# Patient Record
Sex: FEMALE | Race: ASIAN | NOT HISPANIC OR LATINO
[De-identification: names, ages, dates, MRNs, and addresses within clinical notes are randomized per-mention and may not be internally consistent; named-entity substitution may affect disease eponyms.]

---

## 2019-08-13 PROBLEM — Z00.00 ENCOUNTER FOR PREVENTIVE HEALTH EXAMINATION: Status: ACTIVE | Noted: 2019-08-13

## 2019-09-05 ENCOUNTER — APPOINTMENT (OUTPATIENT)
Dept: ORTHOPEDIC SURGERY | Facility: CLINIC | Age: 59
End: 2019-09-05
Payer: SELF-PAY

## 2019-09-05 VITALS
HEART RATE: 75 BPM | DIASTOLIC BLOOD PRESSURE: 75 MMHG | SYSTOLIC BLOOD PRESSURE: 169 MMHG | HEIGHT: 59 IN | BODY MASS INDEX: 35.48 KG/M2 | WEIGHT: 176 LBS

## 2019-09-05 PROCEDURE — 99204 OFFICE O/P NEW MOD 45 MIN: CPT

## 2019-09-05 PROCEDURE — 72170 X-RAY EXAM OF PELVIS: CPT

## 2019-09-05 PROCEDURE — 73562 X-RAY EXAM OF KNEE 3: CPT | Mod: 50

## 2019-09-17 NOTE — DISCUSSION/SUMMARY
[de-identified] : Bilateral Knee advanced degenerative joint disease, left worse than right \par \par The natural history and treatment of degenerative arthritis was discussed with the patient at length today. The spectrum of treatment including nonoperative modalities to surgical intervention was elucidated. Noninvasive and nonoperative treatment modalities include weight reduction, activity modification with low impact exercise,  as needed use of acetaminophen or anti-inflammatory medications if tolerated, glucosamine/chondroitin supplements, and physical therapy. Further treatments can include corticosteroid injection and the use of viscosupplementation with hyaluronic acid injections. Definitive surgical treatment can certainly include total joint arthroplasty also. The risks and benefits of each treatment options was discussed and all questions were answered.\par \par In view of lack of adequate pain relief with conservative (non-surgical) management protocol including physical therapy, home exercises, weight loss, activity modification, NSAIDS; the patient is recommended to consider a Left Total Knee Replacement. \par \par The risks, benefits, alternatives, implications, complications including but not limited to pain, stiffness, bleeding, limp, wound breakdown, infection, bone fracture, nerve and vascular compromise, implant wear, fixation options depending on bone quality, instability, and durability issues and rehabilitation were discussed and relevant questions were addressed. The possibility of recurrent pain, no improvement in pain and actual worsening of the pain were also mentioned in conversation with the patient. Medical complications related to the patient's general medical health including deep vein thrombosis, pulmonary embolus, heart attack, stroke, death and other complications from anesthesia were discussed as well.  Anticoagulation prophylaxis medication options to address risks of deep vein thrombosis and pulmonary embolism were discussed and weighed against the risks of bleeding and wound healing complications. The patient elected Ecotrin/Xarelto prophylaxis with mechanical modalities.\par \par I have reviewed the plan of care as well as a model of a total knee replacement implant equivalent to the one that will be used for their total knee replacement.  The patient agrees with the plan of care, as well as the use of implants for their total knee replacement.  The patient wishes to proceed and will undergo preoperative medical evaluation and clearance, attend the preoperative educational class and will schedule surgery appropriately.\par \par Patient currently has no health insurance, she will discuss with her PCP and find an insurance plan then book surgery.

## 2019-09-17 NOTE — PHYSICAL EXAM
[Antalgic] : antalgic [LE] : Sensory: Intact in bilateral lower extremities [Knee] : patellar 2+ and symmetric bilaterally [Ankle] : ankle 2+ and symmetric bilaterally [DP] : dorsalis pedis 2+ and symmetric bilaterally [PT] : posterior tibial 2+ and symmetric bilaterally [de-identified] : The following radiographs were ordered and read by me during this patients visit. I reviewed each radiograph in detail with the patient and discussed the findings as highlighted below. \par AP, lateral and skyline views of the bilateral knees confirm advanced degenerative joint disease with medial joint space narrowing and osteophyte formation, varus deformity bilaterally. \par AP view of the pelvis are within normal limits  [de-identified] : On general examination the patient is adequately groomed and nourished. The vital parameters are as recorded. \par There is no lymphedema or diffuse swelling, no varicose veins, no skin warmth/erythema/scars/swelling, no ulcers and no palpable lymph nodes or masses in both lower extremities. Bilateral pedal pulses are well palpable.\par Upper Extremity:\par Both right and left upper extremities are unremarkable in terms of skin rash, lesions, pigmentation, redness, tenderness, swelling, joint instability, abnormal deformity or crepitus. The overall range of motion, sensation, motor tone and strength testing are normal.\par \par Bilateral Knee Exam: \par The gait is bilateral stiff knee antalgic.\par Knee alignment:            Right 5 degrees varus with mild flexion deformity. \par Left 5 degrees varus with mild flexion deformity.\par Both knees demonstrate no scars and the skin has no warmth, erythema, swelling or tenderness. \par Both knees have a range of motion of\par Extension:                    Right 2 degrees     Left 2 degrees\par Flexion:                                   Right 100 degrees      Left 90 degrees\par There is bilateral knee medial joint line tenderness. There is bilateral knee mild effusion. \par Quang's test is positive. Lissa test is positive.\par Lachman's test, Anterior/Posterior Drawer test and Pivot Shift Tests are negative. \par There is bilateral knee grade 1 MCL mediolateral laxity and no anteroposterior instability. \par Patella compression test is positive and patellofemoral tracking is normal with no lateral subluxation, apprehension or instability. \par Right knee quadriceps and hamstrings power is 4+.\par Left knee quadriceps and hamstrings power is 4+.\par \par Hip Exam:\par The gait and station is normal\par The patient has equal leg lengths and no pelvic tilt. Alejo/Marisela test is 7 inches on the right and 7 inches on the left. Active SLR is 60 degrees on the right and 60 degrees on the left. Both hips demonstrate no scars and the skin has no signs of inflammation or tenderness. \par Both Hips have a normal range of motion of flexion to 100 degrees, abduction 40 degrees, adduction 20 degrees, external rotation 40 degrees, internal rotation 20 degrees with symmetrical motion in flexion and extension. There is no flexion contracture, deformity or instability. Labral impingement tests are negative.\par Both hips flexor, abductor and extensor power is normal.

## 2019-09-17 NOTE — HISTORY OF PRESENT ILLNESS
[7] : an average pain level of 7/10 [de-identified] : Ms. MARY KATE KLEIN is a 58 year old female presenting with bilateral knee pain, left worse than right, now worsening. She localizes the pain to the medial and anterior aspect of the bilateral knee. The patient describes the pain as dull and achy, and states it is intermittent, based on activity. She states the pain is exacerbated by walking long distance, climbing/descending stairs, and rising from a seated position. Patient admits to swelling of the bilateral knees. She has been taking NSAIDs for pain with only mild temporary relief. She admits to prior conservative management inclusive of physical therapy with only mild improvement in condition. Had a steroid injection 2-3 years ago with only temporary relief. She denies hip or lower back pain. The patient admits to limitations in their quality of life, and is present to discuss options for treatment. JTM \par Patient denies any other pertinent past medical, surgical, familial, of social history.

## 2020-10-02 ENCOUNTER — APPOINTMENT (OUTPATIENT)
Dept: ORTHOPEDIC SURGERY | Facility: CLINIC | Age: 60
End: 2020-10-02
Payer: COMMERCIAL

## 2020-10-02 VITALS
DIASTOLIC BLOOD PRESSURE: 69 MMHG | BODY MASS INDEX: 36.08 KG/M2 | HEART RATE: 80 BPM | SYSTOLIC BLOOD PRESSURE: 156 MMHG | WEIGHT: 179 LBS | HEIGHT: 59 IN

## 2020-10-02 DIAGNOSIS — Z78.9 OTHER SPECIFIED HEALTH STATUS: ICD-10-CM

## 2020-10-02 DIAGNOSIS — M19.90 UNSPECIFIED OSTEOARTHRITIS, UNSPECIFIED SITE: ICD-10-CM

## 2020-10-02 DIAGNOSIS — I83.899 VARICOSE VEINS OF UNSPECIFIED LOWER EXTREMITY WITH OTHER COMPLICATIONS: ICD-10-CM

## 2020-10-02 PROCEDURE — 73562 X-RAY EXAM OF KNEE 3: CPT | Mod: 50

## 2020-10-02 PROCEDURE — 99214 OFFICE O/P EST MOD 30 MIN: CPT

## 2020-10-02 NOTE — PHYSICAL EXAM
[de-identified] : On general examination the patient is adequately groomed and nourished. The vital parameters are as recorded. \par There is no lymphedema, there is diffuse swelling with varicose veins, no skin warmth/erythema/scars/swelling, no ulcers and no palpable lymph nodes or masses in both lower extremities. Bilateral pedal pulses are well palpable.\par Upper Extremity:\par Both right and left upper extremities are unremarkable in terms of skin rash, lesions, pigmentation, redness, tenderness, swelling, joint instability, abnormal deformity or crepitus. The overall range of motion, sensation, motor tone and strength testing are normal.\par \par Bilateral Knee Exam: \par The gait is bilateral stiff knee antalgic.\par Knee alignment:            Right 12 degrees varus with no flexion deformity. \par Left 15 degrees varus with no flexion deformity.\par Both knees demonstrate no scars and the skin has no warmth, erythema, swelling or tenderness. \par Both knees have a range of motion of\par Extension:                    Right -10 degrees     Left -10 degrees\par Flexion:                                   Right 100 degrees      Left 90 degrees\par There is bilateral knee medial joint line tenderness. There is bilateral knee mild effusion. \par Quang's test is positive. Lissa test is positive.\par Lachman's test, Anterior/Posterior Drawer test and Pivot Shift Tests are negative. \par There is bilateral knee grade 1 MCL mediolateral laxity and no anteroposterior instability. \par Patella compression test is positive and patellofemoral tracking is normal with no lateral subluxation, apprehension or instability. \par Right knee quadriceps and hamstrings power is 4+.\par Left knee quadriceps and hamstrings power is 4+.\par \par Hip Exam:\par The gait and station is normal\par The patient has equal leg lengths and no pelvic tilt. Alejo/Marisela test is 7 inches on the right and 7 inches on the left. Active SLR is 60 degrees on the right and 60 degrees on the left. Both hips demonstrate no scars and the skin has no signs of inflammation or tenderness. \par Both Hips have a normal range of motion of flexion to 100 degrees, abduction 40 degrees, adduction 20 degrees, external rotation 40 degrees, internal rotation 20 degrees with symmetrical motion in flexion and extension. There is no flexion contracture, deformity or instability. Labral impingement tests are negative.\par Both hips flexor, abductor and extensor power is normal.\par  [de-identified] : The following radiographs were ordered and read by me during this patients visit. I reviewed each radiograph in detail with the patient and discussed the findings as highlighted below. \par AP, lateral and skyline views of the bilateral knees confirm advanced degenerative joint disease with medial joint space bone on bone articulation, varus malalignment, and osteophyte formation\par AP view of the pelvis are within normal limits\par

## 2020-10-02 NOTE — DISCUSSION/SUMMARY
[de-identified] : Bilateral knee advanced degenerative joint disease, left more than right, with varicose veins and swelling BL LE. \par The natural history and treatment of degenerative arthritis was discussed with the patient at length today. The spectrum of treatment including nonoperative modalities to surgical intervention was elucidated. Noninvasive and nonoperative treatment modalities include weight reduction, activity modification with low impact exercise,  as needed use of acetaminophen or anti-inflammatory medications if tolerated, glucosamine/chondroitin supplements, and physical therapy. Further treatments can include corticosteroid injection and the use of viscosupplementation with hyaluronic acid injections. Definitive surgical treatment can certainly include total joint arthroplasty also. The risks and benefits of each treatment options was discussed and all questions were answered.\par In view of lack of adequate pain relief with conservative (non-surgical) management protocol including physical therapy, home exercises, weight loss, activity modification, NSAIDS; the patient is recommended to consider a staged bilateral Total Knee Replacement, left knee first. \par \par The risks, benefits, alternatives, implications, complications including but not limited to pain, stiffness, bleeding, limp, wound breakdown, infection, bone fracture, nerve and vascular compromise, implant wear, fixation options depending on bone quality, instability, and durability issues and rehabilitation were discussed and relevant questions were addressed. The possibility of recurrent pain, no improvement in pain and actual worsening of the pain were also mentioned in conversation with the patient. Medical complications related to the patient's general medical health including deep vein thrombosis, pulmonary embolus, heart attack, stroke, death and other complications from anesthesia were discussed as well.  Anticoagulation prophylaxis medication options to address risks of deep vein thrombosis and pulmonary embolism were discussed and weighed against the risks of bleeding and wound healing complications. The patient elected Ecotrin/Xarelto prophylaxis with mechanical modalities.\par \par  I have reviewed the plan of care as well as a model of a total knee replacement implant equivalent to the one that will be used for their total knee replacement.  The patient agrees with the plan of care, as well as the use of implants for their total knee replacement.  The patient wishes to proceed and will undergo preoperative medical evaluation and clearance, attend the preoperative educational class and will schedule surgery appropriately.\par \par Patient has been indicated for a CHAZ Robotic Assisted total knee replacement. A prescription for a CT scan with CHAZ Protocol was provided for the left knee.\par \par I have provided the patient a referral to Dr. Marcano for evaluation of varicose veins and swelling. Concerning the swelling, compression has been recommended, as well as elevation of the lower extremities. She  has been recommended for exercise, including foot pumps and walking. She has been provided with a prescription for compression stockings. She will need a preop skin check appointment one week pre operative.

## 2020-11-09 ENCOUNTER — APPOINTMENT (OUTPATIENT)
Dept: VASCULAR SURGERY | Facility: CLINIC | Age: 60
End: 2020-11-09
Payer: COMMERCIAL

## 2020-11-09 VITALS
HEART RATE: 81 BPM | SYSTOLIC BLOOD PRESSURE: 172 MMHG | TEMPERATURE: 97.6 F | DIASTOLIC BLOOD PRESSURE: 85 MMHG | HEIGHT: 59 IN | BODY MASS INDEX: 36.29 KG/M2 | WEIGHT: 180 LBS

## 2020-11-09 DIAGNOSIS — I83.893 VARICOSE VEINS OF BILATERAL LOWER EXTREMITIES WITH OTHER COMPLICATIONS: ICD-10-CM

## 2020-11-09 DIAGNOSIS — I65.23 OCCLUSION AND STENOSIS OF BILATERAL CAROTID ARTERIES: ICD-10-CM

## 2020-11-09 DIAGNOSIS — I87.2 VENOUS INSUFFICIENCY (CHRONIC) (PERIPHERAL): ICD-10-CM

## 2020-11-09 PROCEDURE — 99204 OFFICE O/P NEW MOD 45 MIN: CPT

## 2020-11-09 PROCEDURE — 93880 EXTRACRANIAL BILAT STUDY: CPT | Mod: 26

## 2020-11-09 PROCEDURE — 93970 EXTREMITY STUDY: CPT

## 2020-11-09 PROCEDURE — 93880 EXTRACRANIAL BILAT STUDY: CPT

## 2020-11-09 RX ORDER — LISINOPRIL 5 MG/1
5 TABLET ORAL
Qty: 90 | Refills: 0 | Status: ACTIVE | COMMUNITY
Start: 2020-11-06

## 2020-11-09 RX ORDER — LEVOTHYROXINE SODIUM 0.03 MG/1
25 TABLET ORAL
Qty: 90 | Refills: 0 | Status: ACTIVE | COMMUNITY
Start: 2020-11-06

## 2020-11-09 NOTE — ASSESSMENT
[FreeTextEntry1] : Impression ssymptomatic venous insuff not yet sono evident, \par \par \par Plan Med Conservative management leg elevation, knee high compression stockings  20-30mm Hg (rx given), wt loss, diet control, exercise program, protective measures\par ov w carotid duplex s/o stenosis  4 years  nov 2024\par pt is cleared from vasc surg standpoint for TKR \par f/u w Dr ERASMO Watson MD \par telehealth visit in 2 mo to re eval \par \par \par Varicose veins are enlarged, twisted veins. Varicose veins are caused by increased blood pressure in the veins.  The blood moves towards the heart by 1-way valves in the veins. When the valves become weakened or damaged, blood can collect in the veins and pool in your lower legs (ankles). This causes the veins to become enlarged and incompetent with reflux. Sitting or standing for long periods can cause blood to pool in the leg veins, increasing the pressure within the veins. \par Risk factors for varicose veins or venous disease may include:  obesity, older age, standing or sitting for prolonged periods of time for several years, being female, pregnancy, taking oral contraceptive pills or hormone replacement, being inactive, and/or smoking. \par The most common symptoms of varicose veins are sensations in the legs, such as a heavy feeling, burning, and/or aching. However, each individual may experience symptoms differently.  Other symptoms may include:  color changes in the skin, sores on the legs, or rash.  Severe varicose veins or venous disease may eventually produce long-term mild swelling that can result in more serious skin and tissue problems, such as ulcers and non-healing sores.\par Varicose veins and venous disease are diagnosed by a complete medical history, physical examination, and diagnostic studies for varicose veins including duplex ultrasound and color-flow imaging.  \par Medical treatment for varicose veins and venous disease include:  compression stockings, sclerotherapy, endovenous ablation and/or surgical treatment with microphlebectomy.  \par \par  [Arterial/Venous Disease] : arterial/venous disease [Other: _____] : [unfilled]

## 2020-11-09 NOTE — DATA REVIEWED
[FreeTextEntry1] : 11/9/2020 Venous Doppler Chris LE  no acute dvt svt \par                            RLE no sono evidence of reflux\par                            LLE  no sono evidence of reflux \par                                     \par \par 11/9/2020 Carotid Duplex  Rt ICA  less 50% stenosis  by velocity criteria\par                          Lt  ICA  less 50% stenosis  by velocity criteria\par                           Chris Ant Vertebral Arterial Flow \par \par

## 2020-11-09 NOTE — HISTORY OF PRESENT ILLNESS
[FreeTextEntry1] : Pt c/o bilateral leg pain swelling heaviness and discomfort worse w activity and by the end of the day\par Onset 19 years ago\par Intensity moderate \par Pt denies recent injury, travel or thrombophilic event\par \par Pt denies art insuff sx \par Pt denies any recent  cardiac c/o , SOB, Chest Pain or recent heart attacks \par Pt  is mauricio for Lt TKR w Dr ERASMO Watson \par \par \par

## 2021-08-10 ENCOUNTER — APPOINTMENT (OUTPATIENT)
Dept: ORTHOPEDIC SURGERY | Facility: CLINIC | Age: 61
End: 2021-08-10
Payer: COMMERCIAL

## 2021-08-10 DIAGNOSIS — M17.12 UNILATERAL PRIMARY OSTEOARTHRITIS, LEFT KNEE: ICD-10-CM

## 2021-08-10 PROCEDURE — 73562 X-RAY EXAM OF KNEE 3: CPT | Mod: LT

## 2021-08-10 PROCEDURE — 99215 OFFICE O/P EST HI 40 MIN: CPT

## 2021-08-16 PROBLEM — M17.12 PRIMARY LOCALIZED OSTEOARTHRITIS OF LEFT KNEE: Status: ACTIVE | Noted: 2020-10-02

## 2021-09-08 ENCOUNTER — APPOINTMENT (OUTPATIENT)
Dept: ORTHOPEDIC SURGERY | Facility: CLINIC | Age: 61
End: 2021-09-08

## 2021-10-05 ENCOUNTER — OUTPATIENT (OUTPATIENT)
Dept: OUTPATIENT SERVICES | Facility: HOSPITAL | Age: 61
LOS: 1 days | End: 2021-10-05
Payer: COMMERCIAL

## 2021-10-05 VITALS
DIASTOLIC BLOOD PRESSURE: 77 MMHG | HEIGHT: 58.25 IN | OXYGEN SATURATION: 100 % | RESPIRATION RATE: 18 BRPM | SYSTOLIC BLOOD PRESSURE: 152 MMHG | HEART RATE: 69 BPM | WEIGHT: 179.9 LBS | TEMPERATURE: 97 F

## 2021-10-05 DIAGNOSIS — M17.12 UNILATERAL PRIMARY OSTEOARTHRITIS, LEFT KNEE: ICD-10-CM

## 2021-10-05 DIAGNOSIS — Z29.9 ENCOUNTER FOR PROPHYLACTIC MEASURES, UNSPECIFIED: ICD-10-CM

## 2021-10-05 DIAGNOSIS — Z98.890 OTHER SPECIFIED POSTPROCEDURAL STATES: Chronic | ICD-10-CM

## 2021-10-05 DIAGNOSIS — G47.33 OBSTRUCTIVE SLEEP APNEA (ADULT) (PEDIATRIC): ICD-10-CM

## 2021-10-05 DIAGNOSIS — Z01.818 ENCOUNTER FOR OTHER PREPROCEDURAL EXAMINATION: ICD-10-CM

## 2021-10-05 LAB
ANION GAP SERPL CALC-SCNC: 12 MMOL/L — SIGNIFICANT CHANGE UP (ref 5–17)
BLD GP AB SCN SERPL QL: NEGATIVE — SIGNIFICANT CHANGE UP
BUN SERPL-MCNC: 11 MG/DL — SIGNIFICANT CHANGE UP (ref 7–23)
CALCIUM SERPL-MCNC: 10.3 MG/DL — SIGNIFICANT CHANGE UP (ref 8.4–10.5)
CHLORIDE SERPL-SCNC: 100 MMOL/L — SIGNIFICANT CHANGE UP (ref 96–108)
CO2 SERPL-SCNC: 26 MMOL/L — SIGNIFICANT CHANGE UP (ref 22–31)
CREAT SERPL-MCNC: 0.61 MG/DL — SIGNIFICANT CHANGE UP (ref 0.5–1.3)
GLUCOSE SERPL-MCNC: 83 MG/DL — SIGNIFICANT CHANGE UP (ref 70–99)
HCT VFR BLD CALC: 44.5 % — SIGNIFICANT CHANGE UP (ref 34.5–45)
HGB BLD-MCNC: 13.7 G/DL — SIGNIFICANT CHANGE UP (ref 11.5–15.5)
MCHC RBC-ENTMCNC: 27.8 PG — SIGNIFICANT CHANGE UP (ref 27–34)
MCHC RBC-ENTMCNC: 30.8 GM/DL — LOW (ref 32–36)
MCV RBC AUTO: 90.4 FL — SIGNIFICANT CHANGE UP (ref 80–100)
NRBC # BLD: 0 /100 WBCS — SIGNIFICANT CHANGE UP (ref 0–0)
PLATELET # BLD AUTO: 359 K/UL — SIGNIFICANT CHANGE UP (ref 150–400)
POTASSIUM SERPL-MCNC: 4.2 MMOL/L — SIGNIFICANT CHANGE UP (ref 3.5–5.3)
POTASSIUM SERPL-SCNC: 4.2 MMOL/L — SIGNIFICANT CHANGE UP (ref 3.5–5.3)
RBC # BLD: 4.92 M/UL — SIGNIFICANT CHANGE UP (ref 3.8–5.2)
RBC # FLD: 12.4 % — SIGNIFICANT CHANGE UP (ref 10.3–14.5)
RH IG SCN BLD-IMP: POSITIVE — SIGNIFICANT CHANGE UP
SODIUM SERPL-SCNC: 138 MMOL/L — SIGNIFICANT CHANGE UP (ref 135–145)
WBC # BLD: 9.48 K/UL — SIGNIFICANT CHANGE UP (ref 3.8–10.5)
WBC # FLD AUTO: 9.48 K/UL — SIGNIFICANT CHANGE UP (ref 3.8–10.5)

## 2021-10-05 PROCEDURE — G0463: CPT

## 2021-10-05 PROCEDURE — 83036 HEMOGLOBIN GLYCOSYLATED A1C: CPT

## 2021-10-05 PROCEDURE — 86901 BLOOD TYPING SEROLOGIC RH(D): CPT

## 2021-10-05 PROCEDURE — 80048 BASIC METABOLIC PNL TOTAL CA: CPT

## 2021-10-05 PROCEDURE — 87640 STAPH A DNA AMP PROBE: CPT

## 2021-10-05 PROCEDURE — 87641 MR-STAPH DNA AMP PROBE: CPT

## 2021-10-05 PROCEDURE — 86850 RBC ANTIBODY SCREEN: CPT

## 2021-10-05 PROCEDURE — 85027 COMPLETE CBC AUTOMATED: CPT

## 2021-10-05 PROCEDURE — 86900 BLOOD TYPING SEROLOGIC ABO: CPT

## 2021-10-05 RX ORDER — CEFAZOLIN SODIUM 1 G
2000 VIAL (EA) INJECTION ONCE
Refills: 0 | Status: DISCONTINUED | OUTPATIENT
Start: 2021-10-19 | End: 2021-10-22

## 2021-10-05 NOTE — H&P PST ADULT - ASSESSMENT
CAPRINI SCORE [CLOT]    AGE RELATED RISK FACTORS                                                       MOBILITY RELATED FACTORS  [ ] Age 41-60 years                                            (1 Point)                  [ ] Bed rest                                                        (1 Point)  [x ] Age: 61-74 years                                           (2 Points)                 [ ] Plaster cast                                                   (2 Points)  [ ] Age= 75 years                                              (3 Points)                 [ ] Bed bound for more than 72 hours                 (2 Points)    DISEASE RELATED RISK FACTORS                                               GENDER SPECIFIC FACTORS  [ x] Edema in the lower extremities                       (1 Point)                  [ ] Pregnancy                                                     (1 Point)  [ ] Varicose veins                                               (1 Point)                  [ ] Post-partum < 6 weeks                                   (1 Point)             [ x] BMI > 25 Kg/m2                                            (1 Point)                  [ ] Hormonal therapy  or oral contraception          (1 Point)                 [ ] Sepsis (in the previous month)                        (1 Point)                  [ ] History of pregnancy complications                 (1 point)  [ ] Pneumonia or serious lung disease                                               [ ] Unexplained or recurrent                     (1 Point)           (in the previous month)                               (1 Point)  [ ] Abnormal pulmonary function test                     (1 Point)                 SURGERY RELATED RISK FACTORS  [ ] Acute myocardial infarction                              (1 Point)                 [ ]  Section                                             (1 Point)  [ ] Congestive heart failure (in the previous month)  (1 Point)               [ ] Minor surgery                                                  (1 Point)   [ ] Inflammatory bowel disease                             (1 Point)                 [ ] Arthroscopic surgery                                        (2 Points)  [ ] Central venous access                                      (2 Points)                [x ] General surgery lasting more than 45 minutes   (2 Points)       [ ] Stroke (in the previous month)                          (5 Points)               [ ] Elective arthroplasty                                         (5 Points)                                                                                                                                               HEMATOLOGY RELATED FACTORS                                                 TRAUMA RELATED RISK FACTORS  [ ] Prior episodes of VTE                                     (3 Points)                [ ] Fracture of the hip, pelvis, or leg                       (5 Points)  [ ] Positive family history for VTE                         (3 Points)                 [ ] Acute spinal cord injury (in the previous month)  (5 Points)  [ ] Prothrombin 63719 A                                     (3 Points)                 [ ] Paralysis  (less than 1 month)                             (5 Points)  [ ] Factor V Leiden                                             (3 Points)                  [ ] Multiple Trauma within 1 month                        (5 Points)  [ ] Lupus anticoagulants                                     (3 Points)                                                           [ ] Anticardiolipin antibodies                               (3 Points)                                                       [ ] High homocysteine in the blood                      (3 Points)                                             [ ] Other congenital or acquired thrombophilia      (3 Points)                                                [ ] Heparin induced thrombocytopenia                  (3 Points)                                          Total Score [     6    ]    Caprini Score 0 - 2:  Low Risk, No VTE Prophylaxis required for most patients, encourage ambulation  Caprini Score 3 - 6:  At Risk, pharmacologic VTE prophylaxis is indicated for most patients (in the absence of a contraindication)  Caprini Score Greater than or = 7:  High Risk, pharmacologic VTE prophylaxis is indicated for most patients (in the absence of a contraindication)

## 2021-10-05 NOTE — H&P PST ADULT - PROBLEM SELECTOR PLAN 1
Left total knee replacement with CHAZ robot on 10/19/21 with Dr. Rishabh Carballo.  Pre-op instructions given. Questions answered.  COVID19 PCR at [facility] on 10/16/21.

## 2021-10-05 NOTE — H&P PST ADULT - NSICDXPASTMEDICALHX_GEN_ALL_CORE_FT
PAST MEDICAL HISTORY:  Arthritis     High cholesterol     HTN (hypertension)     Hypothyroidism      PAST MEDICAL HISTORY:  Arthritis     High cholesterol     HTN (hypertension)     Hypothyroidism     Obesity     PVD (peripheral vascular disease)

## 2021-10-05 NOTE — H&P PST ADULT - HISTORY OF PRESENT ILLNESS
Very pleasant 59 yo female in NAD presents to PST prior to scheduled LEFT knee replacement with CHAZ protocol on 10/19/21 with Dr. Rishabh Carballo. She is accompanied by her .  Pmhx includes obesity (BMI 37.3), HTN, HLD, hypothyroidism, arthritis, anxiety. Patient reports intermittent sharp pain in the left knee, accompanied with swelling. Pain is exacerbated with movement, standing for long periods of time and bending. She has tried conservative measures such as NSAIDS, gel injections and rest w/ little pain relief. Reports recent imaging showed "bone on bone arthritis". Shew as recently evaluated by Dr. Carballo and the above surgery was recommended.    Patient denies previous Covid19 infection/exposure,recent travel,fevers,chills,cough,SOB,loss of sense of smell/taste.  COVID19 vaccination series completed.  COVID19 PCR at [facility] on 10/16/21

## 2021-10-05 NOTE — H&P PST ADULT - NSICDXFAMILYHX_GEN_ALL_CORE_FT
FAMILY HISTORY:  Sibling  Still living? Unknown  FH: type 2 diabetes, Age at diagnosis: Age Unknown

## 2021-10-06 LAB
A1C WITH ESTIMATED AVERAGE GLUCOSE RESULT: 6.4 % — HIGH (ref 4–5.6)
ESTIMATED AVERAGE GLUCOSE: 137 MG/DL — HIGH (ref 68–114)
MRSA PCR RESULT.: SIGNIFICANT CHANGE UP
S AUREUS DNA NOSE QL NAA+PROBE: SIGNIFICANT CHANGE UP

## 2021-10-12 PROBLEM — E78.00 PURE HYPERCHOLESTEROLEMIA, UNSPECIFIED: Chronic | Status: ACTIVE | Noted: 2021-10-05

## 2021-10-12 PROBLEM — I10 ESSENTIAL (PRIMARY) HYPERTENSION: Chronic | Status: ACTIVE | Noted: 2021-10-05

## 2021-10-12 PROBLEM — E03.9 HYPOTHYROIDISM, UNSPECIFIED: Chronic | Status: ACTIVE | Noted: 2021-10-05

## 2021-10-12 PROBLEM — M19.90 UNSPECIFIED OSTEOARTHRITIS, UNSPECIFIED SITE: Chronic | Status: ACTIVE | Noted: 2021-10-05

## 2021-10-12 PROBLEM — E66.9 OBESITY, UNSPECIFIED: Chronic | Status: ACTIVE | Noted: 2021-10-05

## 2021-10-12 PROBLEM — I73.9 PERIPHERAL VASCULAR DISEASE, UNSPECIFIED: Chronic | Status: ACTIVE | Noted: 2021-10-05

## 2021-10-14 ENCOUNTER — APPOINTMENT (OUTPATIENT)
Dept: CT IMAGING | Facility: CLINIC | Age: 61
End: 2021-10-14
Payer: COMMERCIAL

## 2021-10-14 ENCOUNTER — OUTPATIENT (OUTPATIENT)
Dept: OUTPATIENT SERVICES | Facility: HOSPITAL | Age: 61
LOS: 1 days | End: 2021-10-14
Payer: COMMERCIAL

## 2021-10-14 DIAGNOSIS — Z98.890 OTHER SPECIFIED POSTPROCEDURAL STATES: Chronic | ICD-10-CM

## 2021-10-14 DIAGNOSIS — M17.12 UNILATERAL PRIMARY OSTEOARTHRITIS, LEFT KNEE: ICD-10-CM

## 2021-10-14 PROCEDURE — 73700 CT LOWER EXTREMITY W/O DYE: CPT | Mod: 26,LT

## 2021-10-14 PROCEDURE — 73700 CT LOWER EXTREMITY W/O DYE: CPT

## 2021-10-16 ENCOUNTER — OUTPATIENT (OUTPATIENT)
Dept: OUTPATIENT SERVICES | Facility: HOSPITAL | Age: 61
LOS: 1 days | End: 2021-10-16
Payer: COMMERCIAL

## 2021-10-16 DIAGNOSIS — Z98.890 OTHER SPECIFIED POSTPROCEDURAL STATES: Chronic | ICD-10-CM

## 2021-10-16 DIAGNOSIS — Z11.52 ENCOUNTER FOR SCREENING FOR COVID-19: ICD-10-CM

## 2021-10-16 LAB — SARS-COV-2 RNA SPEC QL NAA+PROBE: SIGNIFICANT CHANGE UP

## 2021-10-16 PROCEDURE — U0005: CPT

## 2021-10-16 PROCEDURE — U0003: CPT

## 2021-10-16 PROCEDURE — C9803: CPT

## 2021-10-18 ENCOUNTER — TRANSCRIPTION ENCOUNTER (OUTPATIENT)
Age: 61
End: 2021-10-18

## 2021-10-19 ENCOUNTER — APPOINTMENT (OUTPATIENT)
Dept: ORTHOPEDIC SURGERY | Facility: HOSPITAL | Age: 61
End: 2021-10-19

## 2021-10-19 ENCOUNTER — INPATIENT (INPATIENT)
Facility: HOSPITAL | Age: 61
LOS: 2 days | Discharge: NURSING, REHAB OR LONG-TERM CARE | DRG: 470 | End: 2021-10-22
Attending: ORTHOPAEDIC SURGERY | Admitting: ORTHOPAEDIC SURGERY
Payer: COMMERCIAL

## 2021-10-19 VITALS
WEIGHT: 179.9 LBS | HEART RATE: 84 BPM | DIASTOLIC BLOOD PRESSURE: 83 MMHG | OXYGEN SATURATION: 99 % | TEMPERATURE: 98 F | RESPIRATION RATE: 18 BRPM | SYSTOLIC BLOOD PRESSURE: 180 MMHG | HEIGHT: 58 IN

## 2021-10-19 DIAGNOSIS — M17.12 UNILATERAL PRIMARY OSTEOARTHRITIS, LEFT KNEE: ICD-10-CM

## 2021-10-19 DIAGNOSIS — Z98.890 OTHER SPECIFIED POSTPROCEDURAL STATES: Chronic | ICD-10-CM

## 2021-10-19 LAB
GLUCOSE BLDC GLUCOMTR-MCNC: 122 MG/DL — HIGH (ref 70–99)
GLUCOSE BLDC GLUCOMTR-MCNC: 133 MG/DL — HIGH (ref 70–99)
RH IG SCN BLD-IMP: POSITIVE — SIGNIFICANT CHANGE UP

## 2021-10-19 PROCEDURE — S2900 ROBOTIC SURGICAL SYSTEM: CPT | Mod: NC

## 2021-10-19 PROCEDURE — 27447 TOTAL KNEE ARTHROPLASTY: CPT | Mod: LT

## 2021-10-19 PROCEDURE — 0055T BONE SRGRY CMPTR CT/MRI IMAG: CPT

## 2021-10-19 PROCEDURE — 20985 CPTR-ASST DIR MS PX: CPT

## 2021-10-19 PROCEDURE — 73560 X-RAY EXAM OF KNEE 1 OR 2: CPT | Mod: 26,LT

## 2021-10-19 RX ORDER — ATORVASTATIN CALCIUM 80 MG/1
10 TABLET, FILM COATED ORAL AT BEDTIME
Refills: 0 | Status: DISCONTINUED | OUTPATIENT
Start: 2021-10-19 | End: 2021-10-22

## 2021-10-19 RX ORDER — CHLORHEXIDINE GLUCONATE 213 G/1000ML
1 SOLUTION TOPICAL ONCE
Refills: 0 | Status: DISCONTINUED | OUTPATIENT
Start: 2021-10-19 | End: 2021-10-19

## 2021-10-19 RX ORDER — KETOROLAC TROMETHAMINE 30 MG/ML
15 SYRINGE (ML) INJECTION EVERY 6 HOURS
Refills: 0 | Status: DISCONTINUED | OUTPATIENT
Start: 2021-10-19 | End: 2021-10-20

## 2021-10-19 RX ORDER — SODIUM CHLORIDE 9 MG/ML
500 INJECTION, SOLUTION INTRAVENOUS ONCE
Refills: 0 | Status: COMPLETED | OUTPATIENT
Start: 2021-10-19 | End: 2021-10-19

## 2021-10-19 RX ORDER — HYDROMORPHONE HYDROCHLORIDE 2 MG/ML
0.5 INJECTION INTRAMUSCULAR; INTRAVENOUS; SUBCUTANEOUS
Refills: 0 | Status: DISCONTINUED | OUTPATIENT
Start: 2021-10-19 | End: 2021-10-19

## 2021-10-19 RX ORDER — INFLUENZA VIRUS VACCINE 15; 15; 15; 15 UG/.5ML; UG/.5ML; UG/.5ML; UG/.5ML
0.5 SUSPENSION INTRAMUSCULAR ONCE
Refills: 0 | Status: DISCONTINUED | OUTPATIENT
Start: 2021-10-19 | End: 2021-10-22

## 2021-10-19 RX ORDER — LANOLIN ALCOHOL/MO/W.PET/CERES
3 CREAM (GRAM) TOPICAL AT BEDTIME
Refills: 0 | Status: DISCONTINUED | OUTPATIENT
Start: 2021-10-19 | End: 2021-10-22

## 2021-10-19 RX ORDER — LISINOPRIL 2.5 MG/1
5 TABLET ORAL ONCE
Refills: 0 | Status: COMPLETED | OUTPATIENT
Start: 2021-10-19 | End: 2021-10-19

## 2021-10-19 RX ORDER — DEXAMETHASONE 0.5 MG/5ML
8 ELIXIR ORAL ONCE
Refills: 0 | Status: COMPLETED | OUTPATIENT
Start: 2021-10-20 | End: 2021-10-20

## 2021-10-19 RX ORDER — CEFAZOLIN SODIUM 1 G
2000 VIAL (EA) INJECTION EVERY 8 HOURS
Refills: 0 | Status: COMPLETED | OUTPATIENT
Start: 2021-10-19 | End: 2021-10-19

## 2021-10-19 RX ORDER — ASPIRIN/CALCIUM CARB/MAGNESIUM 324 MG
325 TABLET ORAL
Refills: 0 | Status: DISCONTINUED | OUTPATIENT
Start: 2021-10-19 | End: 2021-10-22

## 2021-10-19 RX ORDER — TRAMADOL HYDROCHLORIDE 50 MG/1
50 TABLET ORAL ONCE
Refills: 0 | Status: DISCONTINUED | OUTPATIENT
Start: 2021-10-19 | End: 2021-10-19

## 2021-10-19 RX ORDER — CELECOXIB 200 MG/1
200 CAPSULE ORAL EVERY 12 HOURS
Refills: 0 | Status: DISCONTINUED | OUTPATIENT
Start: 2021-10-20 | End: 2021-10-22

## 2021-10-19 RX ORDER — OXYCODONE HYDROCHLORIDE 5 MG/1
5 TABLET ORAL
Refills: 0 | Status: DISCONTINUED | OUTPATIENT
Start: 2021-10-19 | End: 2021-10-22

## 2021-10-19 RX ORDER — SENNA PLUS 8.6 MG/1
2 TABLET ORAL AT BEDTIME
Refills: 0 | Status: DISCONTINUED | OUTPATIENT
Start: 2021-10-19 | End: 2021-10-22

## 2021-10-19 RX ORDER — LISINOPRIL 2.5 MG/1
5 TABLET ORAL DAILY
Refills: 0 | Status: DISCONTINUED | OUTPATIENT
Start: 2021-10-19 | End: 2021-10-22

## 2021-10-19 RX ORDER — ONDANSETRON 8 MG/1
4 TABLET, FILM COATED ORAL EVERY 6 HOURS
Refills: 0 | Status: DISCONTINUED | OUTPATIENT
Start: 2021-10-19 | End: 2021-10-22

## 2021-10-19 RX ORDER — ACETAMINOPHEN 500 MG
975 TABLET ORAL EVERY 8 HOURS
Refills: 0 | Status: DISCONTINUED | OUTPATIENT
Start: 2021-10-19 | End: 2021-10-22

## 2021-10-19 RX ORDER — TRAMADOL HYDROCHLORIDE 50 MG/1
50 TABLET ORAL EVERY 6 HOURS
Refills: 0 | Status: DISCONTINUED | OUTPATIENT
Start: 2021-10-19 | End: 2021-10-22

## 2021-10-19 RX ORDER — SODIUM CHLORIDE 9 MG/ML
3 INJECTION INTRAMUSCULAR; INTRAVENOUS; SUBCUTANEOUS EVERY 8 HOURS
Refills: 0 | Status: DISCONTINUED | OUTPATIENT
Start: 2021-10-19 | End: 2021-10-19

## 2021-10-19 RX ORDER — LEVOTHYROXINE SODIUM 125 MCG
25 TABLET ORAL DAILY
Refills: 0 | Status: DISCONTINUED | OUTPATIENT
Start: 2021-10-19 | End: 2021-10-22

## 2021-10-19 RX ORDER — SODIUM CHLORIDE 9 MG/ML
500 INJECTION, SOLUTION INTRAVENOUS ONCE
Refills: 0 | Status: COMPLETED | OUTPATIENT
Start: 2021-10-19 | End: 2021-10-20

## 2021-10-19 RX ORDER — PANTOPRAZOLE SODIUM 20 MG/1
40 TABLET, DELAYED RELEASE ORAL ONCE
Refills: 0 | Status: COMPLETED | OUTPATIENT
Start: 2021-10-19 | End: 2021-10-19

## 2021-10-19 RX ORDER — SODIUM CHLORIDE 9 MG/ML
1000 INJECTION INTRAMUSCULAR; INTRAVENOUS; SUBCUTANEOUS
Refills: 0 | Status: DISCONTINUED | OUTPATIENT
Start: 2021-10-20 | End: 2021-10-22

## 2021-10-19 RX ORDER — PANTOPRAZOLE SODIUM 20 MG/1
40 TABLET, DELAYED RELEASE ORAL
Refills: 0 | Status: DISCONTINUED | OUTPATIENT
Start: 2021-10-19 | End: 2021-10-22

## 2021-10-19 RX ORDER — LIDOCAINE HCL 20 MG/ML
0.2 VIAL (ML) INJECTION ONCE
Refills: 0 | Status: DISCONTINUED | OUTPATIENT
Start: 2021-10-19 | End: 2021-10-19

## 2021-10-19 RX ORDER — MAGNESIUM HYDROXIDE 400 MG/1
30 TABLET, CHEWABLE ORAL DAILY
Refills: 0 | Status: DISCONTINUED | OUTPATIENT
Start: 2021-10-19 | End: 2021-10-22

## 2021-10-19 RX ORDER — ACETAMINOPHEN 500 MG
1000 TABLET ORAL ONCE
Refills: 0 | Status: DISCONTINUED | OUTPATIENT
Start: 2021-10-19 | End: 2021-10-22

## 2021-10-19 RX ORDER — OXYCODONE HYDROCHLORIDE 5 MG/1
10 TABLET ORAL
Refills: 0 | Status: DISCONTINUED | OUTPATIENT
Start: 2021-10-19 | End: 2021-10-22

## 2021-10-19 RX ORDER — BENZOCAINE AND MENTHOL 5; 1 G/100ML; G/100ML
1 LIQUID ORAL AT BEDTIME
Refills: 0 | Status: DISCONTINUED | OUTPATIENT
Start: 2021-10-19 | End: 2021-10-22

## 2021-10-19 RX ORDER — POLYETHYLENE GLYCOL 3350 17 G/17G
17 POWDER, FOR SOLUTION ORAL AT BEDTIME
Refills: 0 | Status: DISCONTINUED | OUTPATIENT
Start: 2021-10-19 | End: 2021-10-22

## 2021-10-19 RX ADMIN — BENZOCAINE AND MENTHOL 1 LOZENGE: 5; 1 LIQUID ORAL at 23:01

## 2021-10-19 RX ADMIN — Medication 15 MILLIGRAM(S): at 16:51

## 2021-10-19 RX ADMIN — Medication 975 MILLIGRAM(S): at 23:32

## 2021-10-19 RX ADMIN — Medication 15 MILLIGRAM(S): at 23:32

## 2021-10-19 RX ADMIN — Medication 975 MILLIGRAM(S): at 16:50

## 2021-10-19 RX ADMIN — Medication 25 MICROGRAM(S): at 11:48

## 2021-10-19 RX ADMIN — TRAMADOL HYDROCHLORIDE 50 MILLIGRAM(S): 50 TABLET ORAL at 08:11

## 2021-10-19 RX ADMIN — Medication 325 MILLIGRAM(S): at 17:47

## 2021-10-19 RX ADMIN — POLYETHYLENE GLYCOL 3350 17 GRAM(S): 17 POWDER, FOR SOLUTION ORAL at 23:02

## 2021-10-19 RX ADMIN — Medication 15 MILLIGRAM(S): at 17:20

## 2021-10-19 RX ADMIN — Medication 975 MILLIGRAM(S): at 17:20

## 2021-10-19 RX ADMIN — ATORVASTATIN CALCIUM 10 MILLIGRAM(S): 80 TABLET, FILM COATED ORAL at 23:01

## 2021-10-19 RX ADMIN — TRAMADOL HYDROCHLORIDE 50 MILLIGRAM(S): 50 TABLET ORAL at 18:15

## 2021-10-19 RX ADMIN — SENNA PLUS 2 TABLET(S): 8.6 TABLET ORAL at 23:01

## 2021-10-19 RX ADMIN — Medication 3 MILLIGRAM(S): at 23:01

## 2021-10-19 RX ADMIN — Medication 100 MILLIGRAM(S): at 16:50

## 2021-10-19 RX ADMIN — PANTOPRAZOLE SODIUM 40 MILLIGRAM(S): 20 TABLET, DELAYED RELEASE ORAL at 08:12

## 2021-10-19 RX ADMIN — Medication 150 MILLIGRAM(S): at 08:11

## 2021-10-19 RX ADMIN — Medication 100 MILLIGRAM(S): at 23:01

## 2021-10-19 RX ADMIN — LISINOPRIL 5 MILLIGRAM(S): 2.5 TABLET ORAL at 13:15

## 2021-10-19 RX ADMIN — LISINOPRIL 5 MILLIGRAM(S): 2.5 TABLET ORAL at 11:45

## 2021-10-19 RX ADMIN — Medication 15 MILLIGRAM(S): at 23:02

## 2021-10-19 RX ADMIN — TRAMADOL HYDROCHLORIDE 50 MILLIGRAM(S): 50 TABLET ORAL at 17:47

## 2021-10-19 RX ADMIN — Medication 975 MILLIGRAM(S): at 23:01

## 2021-10-19 NOTE — PHYSICAL THERAPY INITIAL EVALUATION ADULT - PERTINENT HX OF CURRENT PROBLEM, REHAB EVAL
Pt is a 59y/o F with PMH of obesity, HTN, HLD, hypothyroidism, arthritis and anxiety. Patient c/o intermittent sharp pain in the L knee which was exacerbated with movement, standing for long periods of time and bending. Pt s/p L TKA with CHAZ protocol 10/19.

## 2021-10-19 NOTE — PHYSICAL THERAPY INITIAL EVALUATION ADULT - GAIT DEVIATIONS NOTED, PT EVAL
decreased ramya/increased time in double stance/decreased velocity of limb motion/decreased step length/decreased stride length/decreased weight-shifting ability

## 2021-10-19 NOTE — OCCUPATIONAL THERAPY INITIAL EVALUATION ADULT - ANTICIPATED DISCHARGE DISPOSITION, OT EVAL
TBD Subacute rehab. If home, assist from family as needed & home OT to address ADL/IADL, AE/DME, and fall prevention

## 2021-10-19 NOTE — PHYSICAL THERAPY INITIAL EVALUATION ADULT - ACTIVE RANGE OF MOTION EXAMINATION, REHAB EVAL
LLE ROM limited 2/2 TKA/bilateral upper extremity Active ROM was WFL (within functional limits)/Right LE Active ROM was WFL (within functional limits)

## 2021-10-19 NOTE — CHART NOTE - NSCHARTNOTEFT_GEN_A_CORE
Patient seen on 7 [place] with  at bedside. Resting without complaints.  No Chest Pain, SOB, N/V.    T(C): 36.7 (10-19-21 @ 14:50), Max: 36.9 (10-19-21 @ 07:41)  HR: 80 (10-19-21 @ 14:50) (60 - 84)  BP: 166/76 (10-19-21 @ 14:50) (140/67 - 180/83)  RR: 18 (10-19-21 @ 14:50) (14 - 18)  SpO2: 97% (10-19-21 @ 14:50) (97% - 100%)  Wt(kg): --    Exam:  Alert and [place], No Acute Distress  Card: +S1/S2, RRR  Pulm: CTAB  Laterality: Left knee aquacel c/d/i  Calves soft, non-tender bilaterally  +PF/DF/EHL/FHL  SILT  + DP pulse    Xray: Post-op xray in chart             A/P: Patient is a 60y Female S/p Left TKA. VSS. NAD  -PT/OT- WBAT to LLE  -IS encouraged  -DVT PPx - Aspirin 325 mg BID  -Pain Control PRN  -OOB to chair  -FU AM Labs  -Continue Current Tx  -Dispo planning    Monika Barry PA-C  Team Pager #[phone]

## 2021-10-19 NOTE — PHYSICAL THERAPY INITIAL EVALUATION ADULT - ADDITIONAL COMMENTS
Pt lives in a private home with her  and daughter. There a 4 steps to enter and 1 flight of stairs inside with ~10 steps. Pt was (I) with all ADLs and functional mobility, pt used a cane when amb.

## 2021-10-19 NOTE — PHYSICAL THERAPY INITIAL EVALUATION ADULT - TRANSFER SAFETY CONCERNS NOTED: SIT/STAND, REHAB EVAL
decreased balance during turns/losing balance/stand pivot/decreased weight-shifting ability decreased balance during turns/losing balance/decreased weight-shifting ability

## 2021-10-19 NOTE — OCCUPATIONAL THERAPY INITIAL EVALUATION ADULT - LEVEL OF INDEPENDENCE: BED TO CHAIR, REHAB EVAL
Stand Pivot/minimum assist (75% patients effort) Pt took 2 steps/minimum assist (75% patients effort) contact guard

## 2021-10-19 NOTE — PRE-OP CHECKLIST - AS BP NONINV METHOD
Visit Information Date & Time Provider Department Dept. Phone Encounter #  
 2/15/2017  9:20 AM Deshawn Logan MD [facility] [phone] Follow-up Instructions Return in about 3 months (around 5/15/2017). Upcoming Health Maintenance Date Due DTaP/Tdap/Td series (1 - Tdap) 8/4/1968 INFLUENZA AGE 9 TO ADULT 8/1/2016 MEDICARE YEARLY EXAM 8/21/2016 GLAUCOMA SCREENING Q2Y 3/1/2018 BREAST CANCER SCRN MAMMOGRAM 8/4/2018 COLONOSCOPY 8/21/2025 Allergies as of 2/15/2017  Review Complete On: 2/15/2017 By: Deshawn Logan MD  
  
 Severity Noted Reaction Type Reactions Aspirin    Other (comments) \"messes up my kidneys\" Feldene [Piroxicam]  04/17/2012    Other (comments) Kidney damage Morphine    Other (comments)  
 unconsciousness Nsaids (Non-steroidal Anti-inflammatory Drug)  08/24/2011    Other (comments) \"messes up my kidneys\" Other Medication    Not Reported This Time Mycins Penicillins    Hives Sulfa (Sulfonamide Antibiotics)  04/17/2012    Rash Vancomycin  08/28/2015    Itching Possible allergic reaction to Vancomycin. Complained of itching/reddness around forehead/back of neck Current Immunizations  Reviewed on 1/23/2017 Name Date Influenza High Dose Vaccine PF 10/6/2015 11:46 AM  
 Influenza Vaccine 2/7/2014 Influenza Vaccine Split 12/5/2012, 11/29/2011 Influenza Vaccine Whole 11/4/2010 Pneumococcal Conjugate (PCV-13) 10/6/2015 11:49 AM  
 Pneumococcal Polysaccharide (PPSV-23) 12/5/2012 Not reviewed this visit You Were Diagnosed With   
  
 Codes Comments Postlaminectomy syndrome, lumbar region    -  Primary ICD-10-CM: M96.1 ICD-9-CM: 722.83 Lumbosacral radiculopathy     ICD-10-CM: M54.17 ICD-9-CM: 724.4 Lumbosacral spondylosis without myelopathy     ICD-10-CM: P96.074 ICD-9-CM: 721.3  Spasm of muscle     ICD-10-CM: P42.100 
 ICD-9-CM: 728.85 Chronic pain syndrome     ICD-10-CM: G89.4 ICD-9-CM: 338.4 Encounter for long-term (current) use of high-risk medication     ICD-10-CM: Z79.899 ICD-9-CM: V58.69 Vitals BP Pulse Weight(growth percentile) BMI OB Status Smoking Status 129/76 (!) 109 171 lb (77.6 kg) 32.31 kg/m2 Hysterectomy Never Smoker BMI and BSA Data Body Mass Index Body Surface Area  
 32.31 kg/m 2 1.83 m 2 Preferred Pharmacy Pharmacy Name Phone [address], [address] [place] [address] [phone] Your Updated Medication List  
  
   
This list is accurate as of: 2/15/17  9:42 AM.  Always use your most recent med list.  
  
  
  
  
 furosemide 40 mg tablet Commonly known as:  LASIX  
take 1 tablet by mouth daily GOTU SILVIA HERB PO Take  by mouth.  
  
 lansoprazole 30 mg capsule Commonly known as:  PREVACID Take 1 Cap by mouth Daily (before breakfast). lovastatin 40 mg tablet Commonly known as:  MEVACOR Take 1 Tab by mouth nightly. OLANZapine 10 mg tablet Commonly known as:  ZyPREXA Take 10 mg by mouth nightly. oxybutynin chloride XL 10 mg CR tablet Commonly known as:  DITROPAN XL  
TAKE 1 TABLET DAILY  
  
 * oxyCODONE-acetaminophen  mg per tablet Commonly known as:  PERCOCET Take 1 Tab by mouth every four (4) hours as needed for Pain for up to 90 days. Mail order  Indications: PAIN  
  
 * oxyCODONE-acetaminophen  mg per tablet Commonly known as:  PERCOCET Take 1 Tab by mouth every four (4) hours as needed for Pain for up to 90 days. Mail order  Indications: Pain PAXIL 20 mg tablet Generic drug:  PARoxetine Take 20 mg by mouth daily. potassium chloride SA 10 mEq capsule Commonly known as:  MICRO-K  
TAKE 1 CAPSULE TWICE DAILY * Notice: This list has 2 medication(s) that are the same as other medications prescribed for you.  Read the directions carefully, and ask your doctor or other care provider to review them with you. Prescriptions Printed Refills  
 oxyCODONE-acetaminophen (PERCOCET)  mg per tablet 0 Sig: Take 1 Tab by mouth every four (4) hours as needed for Pain for up to 90 days. Mail order  Indications: Pain Class: Print Route: Oral  
  
Follow-up Instructions Return in about 3 months (around 5/15/2017). To-Do List   
 07/24/2017 11:30 AM  
  Appointment with [facility] FAST TRACK NURSE at [facility] INFUSION Patient Instructions Current health maintenance issues were reviewed and the patient was advised to followup with his/her PCP for completion of these items. Introducing \[facility]\"" & HEALTH SERVICES! Felicia Frias introduces [employer] patient portal. Now you can access parts of your medical record, email your doctor's office, and request medication refills online. 1. In your internet browser, go to [email or web address] 2. Click on the First Time User? Click Here link in the Sign In box. You will see the New Member Sign Up page. 3. Enter your Obeo Access Code exactly as it appears below. You will not need to use this code after youve completed the sign-up process. If you do not sign up before the expiration date, you must request a new code. · [employer]be[employer] Access Code: [email or web address] Expires: 2/21/2017 11:40 AM 
 
4. Enter the last four digits of your Social Security Number (xxxx) and Date of Birth (mm/dd/yyyy) as indicated and click Submit. You will be taken to the next sign-up page. 5. Create a ustymet ID. This will be your Obeo login ID and cannot be changed, so think of one that is secure and easy to remember. 6. Create a Obeo password. You can change your password at any time. 7. Enter your Password Reset Question and Answer. This can be used at a later time if you forget your password. 8. Enter your e-mail address.  You will receive e-mail notification when new information is available in [employer]. 9. Click Sign Up. You can now view and download portions of your medical record. 10. Click the Download Summary menu link to download a portable copy of your medical information. If you have questions, please visit the Frequently Asked Questions section of the [employer] website. Remember, [employer] is NOT to be used for urgent needs. For medical emergencies, dial 911. Now available from your iPhone and Android! Please provide this summary of care documentation to your next provider. Your primary care clinician is listed as Mirella Wood. If you have any questions after today's visit, please call [phone]. electronic

## 2021-10-19 NOTE — PRE-OP CHECKLIST -  HIBICLENS SHOWER 2 DATE
29 yom no pmhx who presents with 2 days of left flank pain and fever. no similar sxs prior. no dysuria, hematuria or frequency. no anterior abd pain. no surg hx. has not tried anything for pain at home. 18-Oct-2021 07:43 29 yom no pmhx who presents with 2 days of left flank pain and fever. no similar sxs prior. no dysuria, hematuria or frequency. no anterior abd pain. no surg hx. has not tried anything for pain at home. no uri sxs. no sick contacts. no n/v/d. went to urgent care pta and was sent in for r/o kidney stone

## 2021-10-19 NOTE — OCCUPATIONAL THERAPY INITIAL EVALUATION ADULT - ADL RETRAINING, OT EVAL
Pt will complete lower body dressing with independence within 4 weeks Pt will complete lower body dressing with independence using AE within 4 weeks

## 2021-10-19 NOTE — BRIEF OPERATIVE NOTE - NSICDXBRIEFPOSTOP_GEN_ALL_CORE_FT
POST-OP DIAGNOSIS:  Unilateral primary osteoarthritis, left knee 19-Oct-2021 10:23:09  Luke Carmen

## 2021-10-19 NOTE — PRE-ANESTHESIA EVALUATION ADULT - NSANTHPMHFT_GEN_ALL_CORE
chart and med note reviewed. denies hx cv/pulm dz - good wet, no cp/sob. ekg noted. elevated fs/a1c, no formal dm dx - surgeons notified and wish to defer w/u to post-op.

## 2021-10-19 NOTE — OCCUPATIONAL THERAPY INITIAL EVALUATION ADULT - PERTINENT HX OF CURRENT PROBLEM, REHAB EVAL
61 yo F in NAD presents for scheduled LEFT knee replacement with CHAZ protocol on 10/19/21. Pmhx obesity (BMI 37.3), HTN, HLD, hypothyroidism, arthritis, anxiety. Patient reports intermittent sharp pain in the left knee, accompanied with swelling. Pain is exacerbated with movement, standing for long periods of time and bending. She has tried conservative measures such as NSAIDS, gel injections and rest w/ little pain relief. Reports recent imaging showed "bone on bone arthritis".

## 2021-10-19 NOTE — PHYSICAL THERAPY INITIAL EVALUATION ADULT - BED MOBILITY TRAINING, PT EVAL
Data: Vital signs stable, assessments within normal limits. Facial bruising noted on infant.   Feeding well, tolerated and retained. Mother breastfeeding baby independently. Baby down 4.4% weight loss.   Cord drying, no signs of infection noted. Cord clamp removed.   Baby voiding and stooling.    screen drawn. Serum bili low intermediate. CCHD complete: pass.   Action: provided education on 24 hour screenings and second night for baby.   Response: continue plan of care.    GOAL: Pt will perform bed mobility (I) in 2 wks

## 2021-10-19 NOTE — PHYSICAL THERAPY INITIAL EVALUATION ADULT - PLANNED THERAPY INTERVENTIONS, PT EVAL
Stairs Negotiation GOAL: Pt will negotiate one flight of stairs (I) in 2 wks/balance training/bed mobility training/gait training/strengthening/transfer training

## 2021-10-19 NOTE — PATIENT PROFILE ADULT - IS PATIENT POST-MENOPAUSAL?
Problem: SAFETY  Goal: Free from accidental physical injury  Outcome: Ongoing  Goal: Free from intentional harm  Outcome: Ongoing     Problem: DAILY CARE  Goal: Daily care needs are met  Outcome: Ongoing     Problem: PAIN  Goal: Patient's pain/discomfort is manageable  Outcome: Ongoing     Problem: SKIN INTEGRITY  Goal: Skin integrity is maintained or improved  Outcome: Ongoing     Problem: KNOWLEDGE DEFICIT  Goal: Patient/S.O. demonstrates understanding of disease process, treatment plan, medications, and discharge instructions.   Outcome: Ongoing     Problem: DISCHARGE BARRIERS  Goal: Patient's continuum of care needs are met  Outcome: Ongoing     Problem: Falls - Risk of:  Goal: Will remain free from falls  Description: Will remain free from falls  Outcome: Ongoing  Goal: Absence of physical injury  Description: Absence of physical injury  Outcome: Ongoing     Problem: HEMODYNAMIC STATUS  Goal: Patient has stable vital signs and fluid balance  Outcome: Ongoing     Problem: ACTIVITY INTOLERANCE/IMPAIRED MOBILITY  Goal: Mobility/activity is maintained at optimum level for patient  Outcome: Ongoing     Problem: COMMUNICATION IMPAIRMENT  Goal: Ability to express needs and understand communication  Outcome: Ongoing     Problem: Pain:  Goal: Pain level will decrease  Description: Pain level will decrease  Outcome: Ongoing  Goal: Control of acute pain  Description: Control of acute pain  Outcome: Ongoing  Goal: Control of chronic pain  Description: Control of chronic pain  Outcome: Ongoing yes

## 2021-10-19 NOTE — OCCUPATIONAL THERAPY INITIAL EVALUATION ADULT - LEVEL OF INDEPENDENCE: DRESS LOWER BODY, OT EVAL
don socks/minimum assist (75% patients effort) sock management/moderate assist (50% patients effort)

## 2021-10-20 ENCOUNTER — TRANSCRIPTION ENCOUNTER (OUTPATIENT)
Age: 61
End: 2021-10-20

## 2021-10-20 LAB
ANION GAP SERPL CALC-SCNC: 11 MMOL/L — SIGNIFICANT CHANGE UP (ref 5–17)
BUN SERPL-MCNC: 12 MG/DL — SIGNIFICANT CHANGE UP (ref 7–23)
CALCIUM SERPL-MCNC: 9.3 MG/DL — SIGNIFICANT CHANGE UP (ref 8.4–10.5)
CHLORIDE SERPL-SCNC: 104 MMOL/L — SIGNIFICANT CHANGE UP (ref 96–108)
CO2 SERPL-SCNC: 24 MMOL/L — SIGNIFICANT CHANGE UP (ref 22–31)
CREAT SERPL-MCNC: 0.6 MG/DL — SIGNIFICANT CHANGE UP (ref 0.5–1.3)
GLUCOSE SERPL-MCNC: 145 MG/DL — HIGH (ref 70–99)
HCT VFR BLD CALC: 38.1 % — SIGNIFICANT CHANGE UP (ref 34.5–45)
HGB BLD-MCNC: 12.1 G/DL — SIGNIFICANT CHANGE UP (ref 11.5–15.5)
MCHC RBC-ENTMCNC: 28.7 PG — SIGNIFICANT CHANGE UP (ref 27–34)
MCHC RBC-ENTMCNC: 31.8 GM/DL — LOW (ref 32–36)
MCV RBC AUTO: 90.5 FL — SIGNIFICANT CHANGE UP (ref 80–100)
NRBC # BLD: 0 /100 WBCS — SIGNIFICANT CHANGE UP (ref 0–0)
PLATELET # BLD AUTO: 317 K/UL — SIGNIFICANT CHANGE UP (ref 150–400)
POTASSIUM SERPL-MCNC: 4.1 MMOL/L — SIGNIFICANT CHANGE UP (ref 3.5–5.3)
POTASSIUM SERPL-SCNC: 4.1 MMOL/L — SIGNIFICANT CHANGE UP (ref 3.5–5.3)
RBC # BLD: 4.21 M/UL — SIGNIFICANT CHANGE UP (ref 3.8–5.2)
RBC # FLD: 12.4 % — SIGNIFICANT CHANGE UP (ref 10.3–14.5)
SODIUM SERPL-SCNC: 139 MMOL/L — SIGNIFICANT CHANGE UP (ref 135–145)
WBC # BLD: 16.42 K/UL — HIGH (ref 3.8–10.5)
WBC # FLD AUTO: 16.42 K/UL — HIGH (ref 3.8–10.5)

## 2021-10-20 RX ORDER — OXYCODONE HYDROCHLORIDE 5 MG/1
1 TABLET ORAL
Qty: 0 | Refills: 0 | DISCHARGE
Start: 2021-10-20

## 2021-10-20 RX ORDER — TRAMADOL HYDROCHLORIDE 50 MG/1
1 TABLET ORAL
Qty: 0 | Refills: 0 | DISCHARGE
Start: 2021-10-20

## 2021-10-20 RX ORDER — PANTOPRAZOLE SODIUM 20 MG/1
1 TABLET, DELAYED RELEASE ORAL
Qty: 0 | Refills: 0 | DISCHARGE
Start: 2021-10-20

## 2021-10-20 RX ORDER — HYDRALAZINE HCL 50 MG
5 TABLET ORAL ONCE
Refills: 0 | Status: COMPLETED | OUTPATIENT
Start: 2021-10-20 | End: 2021-10-20

## 2021-10-20 RX ORDER — ASPIRIN/CALCIUM CARB/MAGNESIUM 324 MG
1 TABLET ORAL
Qty: 0 | Refills: 0 | DISCHARGE
Start: 2021-10-20

## 2021-10-20 RX ORDER — SENNA PLUS 8.6 MG/1
2 TABLET ORAL
Qty: 0 | Refills: 0 | DISCHARGE
Start: 2021-10-20

## 2021-10-20 RX ORDER — POLYETHYLENE GLYCOL 3350 17 G/17G
17 POWDER, FOR SOLUTION ORAL
Qty: 0 | Refills: 0 | DISCHARGE
Start: 2021-10-20

## 2021-10-20 RX ORDER — ACETAMINOPHEN 500 MG
3 TABLET ORAL
Qty: 0 | Refills: 0 | DISCHARGE
Start: 2021-10-20

## 2021-10-20 RX ADMIN — SENNA PLUS 2 TABLET(S): 8.6 TABLET ORAL at 21:57

## 2021-10-20 RX ADMIN — Medication 15 MILLIGRAM(S): at 06:11

## 2021-10-20 RX ADMIN — LISINOPRIL 5 MILLIGRAM(S): 2.5 TABLET ORAL at 05:41

## 2021-10-20 RX ADMIN — Medication 975 MILLIGRAM(S): at 16:47

## 2021-10-20 RX ADMIN — SODIUM CHLORIDE 500 MILLILITER(S): 9 INJECTION, SOLUTION INTRAVENOUS at 05:41

## 2021-10-20 RX ADMIN — Medication 5 MILLIGRAM(S): at 23:31

## 2021-10-20 RX ADMIN — Medication 975 MILLIGRAM(S): at 23:05

## 2021-10-20 RX ADMIN — Medication 25 MICROGRAM(S): at 05:40

## 2021-10-20 RX ADMIN — Medication 975 MILLIGRAM(S): at 23:40

## 2021-10-20 RX ADMIN — Medication 325 MILLIGRAM(S): at 05:40

## 2021-10-20 RX ADMIN — PANTOPRAZOLE SODIUM 40 MILLIGRAM(S): 20 TABLET, DELAYED RELEASE ORAL at 05:40

## 2021-10-20 RX ADMIN — Medication 975 MILLIGRAM(S): at 09:15

## 2021-10-20 RX ADMIN — POLYETHYLENE GLYCOL 3350 17 GRAM(S): 17 POWDER, FOR SOLUTION ORAL at 21:57

## 2021-10-20 RX ADMIN — OXYCODONE HYDROCHLORIDE 5 MILLIGRAM(S): 5 TABLET ORAL at 21:57

## 2021-10-20 RX ADMIN — Medication 15 MILLIGRAM(S): at 11:20

## 2021-10-20 RX ADMIN — Medication 15 MILLIGRAM(S): at 05:41

## 2021-10-20 RX ADMIN — Medication 15 MILLIGRAM(S): at 11:50

## 2021-10-20 RX ADMIN — Medication 3 MILLIGRAM(S): at 21:57

## 2021-10-20 RX ADMIN — Medication 101.6 MILLIGRAM(S): at 05:40

## 2021-10-20 RX ADMIN — CELECOXIB 200 MILLIGRAM(S): 200 CAPSULE ORAL at 18:30

## 2021-10-20 RX ADMIN — Medication 325 MILLIGRAM(S): at 17:59

## 2021-10-20 RX ADMIN — Medication 975 MILLIGRAM(S): at 08:47

## 2021-10-20 RX ADMIN — Medication 975 MILLIGRAM(S): at 16:17

## 2021-10-20 RX ADMIN — OXYCODONE HYDROCHLORIDE 5 MILLIGRAM(S): 5 TABLET ORAL at 22:36

## 2021-10-20 RX ADMIN — CELECOXIB 200 MILLIGRAM(S): 200 CAPSULE ORAL at 17:59

## 2021-10-20 RX ADMIN — ATORVASTATIN CALCIUM 10 MILLIGRAM(S): 80 TABLET, FILM COATED ORAL at 21:57

## 2021-10-20 NOTE — PROGRESS NOTE ADULT - ASSESSMENT
A/P: Patient is a 60y Female S/p Left TKA. VSS. NAD    -PT/OT- WBAT to LLE  -IS encouraged  -DVT PPx - Aspirin 325 mg BID  -Pain Control PRN  -OOB to chair  -FU AM Labs  -Continue Current Tx  -Dispo planning

## 2021-10-20 NOTE — PROVIDER CONTACT NOTE (OTHER) - ASSESSMENT
Pt moved to bed, c/o 8/10 pain, but requesting only 5 mg oxycodone. Medication given. All other VSS.

## 2021-10-20 NOTE — DISCHARGE NOTE PROVIDER - NSDCMRMEDTOKEN_GEN_ALL_CORE_FT
acetaminophen 325 mg oral tablet: 3 tab(s) orally every 8 hours  aspirin 325 mg oral tablet: 1 tab(s) orally 2 times a day  atorvastatin 10 mg oral tablet: 1 tab(s) orally once a day  levothyroxine 25 mcg (0.025 mg) oral capsule: 1 cap(s) orally once a day  lisinopril 5 mg oral tablet: 1 tab(s) orally once a day  oxyCODONE 5 mg oral tablet: 1 or 2 tab(s) orally every 4 hours, as needed for moderate to severe pain  pantoprazole 40 mg oral delayed release tablet: 1 tab(s) orally once a day (before a meal)  polyethylene glycol 3350 oral powder for reconstitution: 17 gram(s) orally once a day (at bedtime), As Needed  senna oral tablet: 2 tab(s) orally once a day (at bedtime)  traMADol 50 mg oral tablet: 1 tab(s) orally every 6 hours, As needed, Mild Pain (1 - 3)

## 2021-10-20 NOTE — DISCHARGE NOTE PROVIDER - CARE PROVIDER_API CALL
Rishabh Carballo)  Orthopaedic Surgery  [address], [address]  [place] [address]  Phone: [phone]  Fax: [phone]  Follow Up Time:

## 2021-10-20 NOTE — DISCHARGE NOTE PROVIDER - HOSPITAL COURSE
History of Present Illness    Very pleasant 59 yo female in NAD presents to [facility] prior to scheduled LEFT knee replacement with CHAZ protocol on 10/19/21 with Dr. Rishabh Carballo. She is accompanied by her .  Pmhx includes obesity (BMI 37.3), HTN, HLD, hypothyroidism, arthritis, anxiety. Patient reports intermittent sharp pain in the left knee, accompanied with swelling. Pain is exacerbated with movement, standing for long periods of time and bending. She has tried conservative measures such as NSAIDS, gel injections and rest w/ little pain relief. Reports recent imaging showed "bone on bone arthritis". Shew as recently evaluated by Dr. Carballo and the above surgery was recommended.    Patient denies previous Covid19 infection/exposure,recent travel,fevers,chills,cough,SOB,loss of sense of smell/taste.  COVID19 vaccination series completed.  COVID19 PCR at [facility] on 10/16/21    Allergies/Medications:   Allergies:        Allergies:  	No Known Allergies:     Home Medications:   * Patient Currently Takes Medications as of 05-Oct-2021 14:39 documented in Structured Notes  · 	lisinopril 5 mg oral tablet: Last Dose Taken:  , 1 tab(s) orally once a day  · 	atorvastatin 10 mg oral tablet: Last Dose Taken:  , 1 tab(s) orally once a day  · 	levothyroxine 25 mcg (0.025 mg) oral capsule: Last Dose Taken:  , 1 cap(s) orally once a day    PMH/PSH/FH/SH:    Past Medical, Past Surgical, and Family History:  PAST MEDICAL HISTORY:  Arthritis   High cholesterol   HTN (hypertension)   Hypothyroidism   Obesity   PVD (peripheral vascular disease).     PAST SURGICAL HISTORY:  H/O umbilical hernia repair.        Patient presents to the hospital for elective surgery, underwent a left total knee replacement-  -tolerated procedure without complications.  Patient was evaluated by Physical therapy whom recommended home with home PT for discharge plan.  Patient is stable for discharge when cleared by PT.     History of Present Illness    Very pleasant 61 yo female in NAD presents to [facility] prior to scheduled LEFT knee replacement with CHAZ protocol on 10/19/21 with Dr. Rishabh Carballo. She is accompanied by her .  Pmhx includes obesity (BMI 37.3), HTN, HLD, hypothyroidism, arthritis, anxiety. Patient reports intermittent sharp pain in the left knee, accompanied with swelling. Pain is exacerbated with movement, standing for long periods of time and bending. She has tried conservative measures such as NSAIDS, gel injections and rest w/ little pain relief. Reports recent imaging showed "bone on bone arthritis". Shew as recently evaluated by Dr. Carballo and the above surgery was recommended.    Patient denies previous Covid19 infection/exposure,recent travel,fevers,chills,cough,SOB,loss of sense of smell/taste.  COVID19 vaccination series completed.  COVID19 PCR at [facility] on 10/16/21    Allergies/Medications:   Allergies:        Allergies:  	No Known Allergies:     Home Medications:   * Patient Currently Takes Medications as of 05-Oct-2021 14:39 documented in Structured Notes  · 	lisinopril 5 mg oral tablet: Last Dose Taken:  , 1 tab(s) orally once a day  · 	atorvastatin 10 mg oral tablet: Last Dose Taken:  , 1 tab(s) orally once a day  · 	levothyroxine 25 mcg (0.025 mg) oral capsule: Last Dose Taken:  , 1 cap(s) orally once a day    PMH/PSH/FH/SH:    Past Medical, Past Surgical, and Family History:  PAST MEDICAL HISTORY:  Arthritis   High cholesterol   HTN (hypertension)   Hypothyroidism   Obesity   PVD (peripheral vascular disease).     PAST SURGICAL HISTORY:  H/O umbilical hernia repair.        Patient presents to the hospital for elective surgery, underwent a left total knee replacement-  -tolerated procedure without complications.  Patient was evaluated by Physical therapy whom recommended rehab for discharge plan.  Patient is stable for discharge when bed is available.     History of Present Illness    Very pleasant 59 yo female in NAD presents to [facility] prior to scheduled LEFT knee replacement with CHAZ protocol on 10/19/21 with Dr. Rishabh Carballo. She is accompanied by her .  Pmhx includes obesity (BMI 37.3), HTN, HLD, hypothyroidism, arthritis, anxiety. Patient reports intermittent sharp pain in the left knee, accompanied with swelling. Pain is exacerbated with movement, standing for long periods of time and bending. She has tried conservative measures such as NSAIDS, gel injections and rest w/ little pain relief. Reports recent imaging showed "bone on bone arthritis". Shew as recently evaluated by Dr. Carballo and the above surgery was recommended.    Goal: To be able to walk 30 minutes without pain.    Patient denies previous Covid19 infection/exposure,recent travel,fevers,chills,cough,SOB,loss of sense of smell/taste.  COVID19 vaccination series completed.  COVID19 PCR at [facility] on 10/16/21    Allergies/Medications:   Allergies:        Allergies:  	No Known Allergies:     Home Medications:   * Patient Currently Takes Medications as of 05-Oct-2021 14:39 documented in Structured Notes  · 	lisinopril 5 mg oral tablet: Last Dose Taken:  , 1 tab(s) orally once a day  · 	atorvastatin 10 mg oral tablet: Last Dose Taken:  , 1 tab(s) orally once a day  · 	levothyroxine 25 mcg (0.025 mg) oral capsule: Last Dose Taken:  , 1 cap(s) orally once a day    PMH/PSH/FH/SH:    Past Medical, Past Surgical, and Family History:  PAST MEDICAL HISTORY:  Arthritis   High cholesterol   HTN (hypertension)   Hypothyroidism   Obesity   PVD (peripheral vascular disease).     PAST SURGICAL HISTORY:  H/O umbilical hernia repair.        Patient presents to the hospital for elective surgery, underwent a left total knee replacement-  -tolerated procedure without complications.  Patient was evaluated by Physical therapy whom recommended rehab for discharge plan.  Patient is stable for discharge when bed is available.     History of Present Illness    Very pleasant 59 yo female in NAD presents to [facility] prior to scheduled LEFT knee replacement with CHAZ protocol on 10/19/21 with Dr. Rishabh Carballo. She is accompanied by her .  Pmhx includes obesity (BMI 37.3), HTN, HLD, hypothyroidism, arthritis, anxiety. Patient reports intermittent sharp pain in the left knee, accompanied with swelling. Pain is exacerbated with movement, standing for long periods of time and bending. She has tried conservative measures such as NSAIDS, gel injections and rest w/ little pain relief. Reports recent imaging showed "bone on bone arthritis". She was recently evaluated by Dr. Carballo and the above surgery was recommended.    Goal: To be able to walk 30 minutes without pain.    Patient denies previous Covid19 infection/exposure,recent travel,fevers,chills,cough,SOB,loss of sense of smell/taste.  COVID19 vaccination series completed.  COVID19 PCR at [facility] on 10/16/21    Allergies/Medications:   Allergies:        Allergies:  	No Known Allergies:     Home Medications:   * Patient Currently Takes Medications as of 05-Oct-2021 14:39 documented in Structured Notes  · 	lisinopril 5 mg oral tablet: Last Dose Taken:  , 1 tab(s) orally once a day  · 	atorvastatin 10 mg oral tablet: Last Dose Taken:  , 1 tab(s) orally once a day  · 	levothyroxine 25 mcg (0.025 mg) oral capsule: Last Dose Taken:  , 1 cap(s) orally once a day    PMH/PSH/FH/SH:    Past Medical, Past Surgical, and Family History:  PAST MEDICAL HISTORY:  Arthritis   High cholesterol   HTN (hypertension)   Hypothyroidism   Obesity   PVD (peripheral vascular disease).     PAST SURGICAL HISTORY:  H/O umbilical hernia repair.        Patient presents to the hospital for elective surgery, underwent a left total knee replacement-  -tolerated procedure without complications.  Patient was evaluated by Physical therapy whom recommended rehab for discharge plan.  Patient is stable for discharge when bed is available.

## 2021-10-20 NOTE — DISCHARGE NOTE PROVIDER - NSDCFUADDINST_GEN_ALL_CORE_FT
Dressing will be changed during office visit.   Out of bed, ambulate, weight bearing as tolerated-   Physical therapy to assist with exercise and help increase endurance.  Patient may shower, limit direct water to dressing, if wet pat dry

## 2021-10-20 NOTE — DISCHARGE NOTE PROVIDER - NSDCFUADDAPPT_GEN_ALL_CORE_FT
Please call Dr. Carballo' s office within next few days to schedule a follow up appointment about 14 days after surgery.   Recommend follow up with medical MD, within next 4 weeks.

## 2021-10-21 LAB — SARS-COV-2 RNA SPEC QL NAA+PROBE: SIGNIFICANT CHANGE UP

## 2021-10-21 RX ORDER — ONDANSETRON 8 MG/1
4 TABLET, FILM COATED ORAL ONCE
Refills: 0 | Status: DISCONTINUED | OUTPATIENT
Start: 2021-10-21 | End: 2021-10-22

## 2021-10-21 RX ORDER — HYDRALAZINE HCL 50 MG
5 TABLET ORAL ONCE
Refills: 0 | Status: COMPLETED | OUTPATIENT
Start: 2021-10-21 | End: 2021-10-21

## 2021-10-21 RX ADMIN — OXYCODONE HYDROCHLORIDE 10 MILLIGRAM(S): 5 TABLET ORAL at 21:39

## 2021-10-21 RX ADMIN — PANTOPRAZOLE SODIUM 40 MILLIGRAM(S): 20 TABLET, DELAYED RELEASE ORAL at 05:20

## 2021-10-21 RX ADMIN — CELECOXIB 200 MILLIGRAM(S): 200 CAPSULE ORAL at 17:45

## 2021-10-21 RX ADMIN — CELECOXIB 200 MILLIGRAM(S): 200 CAPSULE ORAL at 05:20

## 2021-10-21 RX ADMIN — CELECOXIB 200 MILLIGRAM(S): 200 CAPSULE ORAL at 05:50

## 2021-10-21 RX ADMIN — Medication 325 MILLIGRAM(S): at 17:15

## 2021-10-21 RX ADMIN — LISINOPRIL 5 MILLIGRAM(S): 2.5 TABLET ORAL at 05:20

## 2021-10-21 RX ADMIN — Medication 3 MILLIGRAM(S): at 21:39

## 2021-10-21 RX ADMIN — OXYCODONE HYDROCHLORIDE 10 MILLIGRAM(S): 5 TABLET ORAL at 00:41

## 2021-10-21 RX ADMIN — Medication 25 MICROGRAM(S): at 05:20

## 2021-10-21 RX ADMIN — Medication 975 MILLIGRAM(S): at 16:15

## 2021-10-21 RX ADMIN — OXYCODONE HYDROCHLORIDE 10 MILLIGRAM(S): 5 TABLET ORAL at 00:00

## 2021-10-21 RX ADMIN — ATORVASTATIN CALCIUM 10 MILLIGRAM(S): 80 TABLET, FILM COATED ORAL at 21:40

## 2021-10-21 RX ADMIN — Medication 975 MILLIGRAM(S): at 09:41

## 2021-10-21 RX ADMIN — OXYCODONE HYDROCHLORIDE 5 MILLIGRAM(S): 5 TABLET ORAL at 15:44

## 2021-10-21 RX ADMIN — Medication 325 MILLIGRAM(S): at 05:20

## 2021-10-21 RX ADMIN — OXYCODONE HYDROCHLORIDE 10 MILLIGRAM(S): 5 TABLET ORAL at 22:10

## 2021-10-21 RX ADMIN — Medication 5 MILLIGRAM(S): at 00:41

## 2021-10-21 RX ADMIN — Medication 975 MILLIGRAM(S): at 10:10

## 2021-10-21 RX ADMIN — CELECOXIB 200 MILLIGRAM(S): 200 CAPSULE ORAL at 17:15

## 2021-10-21 RX ADMIN — OXYCODONE HYDROCHLORIDE 5 MILLIGRAM(S): 5 TABLET ORAL at 16:15

## 2021-10-21 RX ADMIN — Medication 975 MILLIGRAM(S): at 15:44

## 2021-10-21 NOTE — PROGRESS NOTE ADULT - ASSESSMENT
A/P 60y year old Female s/p  L TKA, POD#2  Pain Control  WBAT  PT/OOB  DVT PPx  Dispo Planning: patient requesting [facility]

## 2021-10-22 ENCOUNTER — TRANSCRIPTION ENCOUNTER (OUTPATIENT)
Age: 61
End: 2021-10-22

## 2021-10-22 VITALS
SYSTOLIC BLOOD PRESSURE: 153 MMHG | DIASTOLIC BLOOD PRESSURE: 52 MMHG | HEART RATE: 75 BPM | RESPIRATION RATE: 18 BRPM | TEMPERATURE: 99 F | OXYGEN SATURATION: 97 %

## 2021-10-22 PROCEDURE — C1713: CPT

## 2021-10-22 PROCEDURE — 97116 GAIT TRAINING THERAPY: CPT

## 2021-10-22 PROCEDURE — 97161 PT EVAL LOW COMPLEX 20 MIN: CPT

## 2021-10-22 PROCEDURE — 97530 THERAPEUTIC ACTIVITIES: CPT

## 2021-10-22 PROCEDURE — 82962 GLUCOSE BLOOD TEST: CPT

## 2021-10-22 PROCEDURE — U0005: CPT

## 2021-10-22 PROCEDURE — 97110 THERAPEUTIC EXERCISES: CPT

## 2021-10-22 PROCEDURE — U0003: CPT

## 2021-10-22 PROCEDURE — C1776: CPT

## 2021-10-22 PROCEDURE — 97535 SELF CARE MNGMENT TRAINING: CPT

## 2021-10-22 PROCEDURE — 85027 COMPLETE CBC AUTOMATED: CPT

## 2021-10-22 PROCEDURE — S2900: CPT

## 2021-10-22 PROCEDURE — 36415 COLL VENOUS BLD VENIPUNCTURE: CPT

## 2021-10-22 PROCEDURE — 73560 X-RAY EXAM OF KNEE 1 OR 2: CPT

## 2021-10-22 PROCEDURE — 80048 BASIC METABOLIC PNL TOTAL CA: CPT

## 2021-10-22 PROCEDURE — 97165 OT EVAL LOW COMPLEX 30 MIN: CPT

## 2021-10-22 RX ADMIN — OXYCODONE HYDROCHLORIDE 10 MILLIGRAM(S): 5 TABLET ORAL at 09:49

## 2021-10-22 RX ADMIN — CELECOXIB 200 MILLIGRAM(S): 200 CAPSULE ORAL at 17:16

## 2021-10-22 RX ADMIN — OXYCODONE HYDROCHLORIDE 10 MILLIGRAM(S): 5 TABLET ORAL at 02:30

## 2021-10-22 RX ADMIN — Medication 325 MILLIGRAM(S): at 06:25

## 2021-10-22 RX ADMIN — Medication 325 MILLIGRAM(S): at 17:16

## 2021-10-22 RX ADMIN — Medication 25 MICROGRAM(S): at 06:26

## 2021-10-22 RX ADMIN — Medication 975 MILLIGRAM(S): at 08:07

## 2021-10-22 RX ADMIN — CELECOXIB 200 MILLIGRAM(S): 200 CAPSULE ORAL at 07:00

## 2021-10-22 RX ADMIN — OXYCODONE HYDROCHLORIDE 10 MILLIGRAM(S): 5 TABLET ORAL at 10:20

## 2021-10-22 RX ADMIN — OXYCODONE HYDROCHLORIDE 10 MILLIGRAM(S): 5 TABLET ORAL at 02:02

## 2021-10-22 RX ADMIN — CELECOXIB 200 MILLIGRAM(S): 200 CAPSULE ORAL at 06:25

## 2021-10-22 RX ADMIN — PANTOPRAZOLE SODIUM 40 MILLIGRAM(S): 20 TABLET, DELAYED RELEASE ORAL at 06:25

## 2021-10-22 RX ADMIN — Medication 975 MILLIGRAM(S): at 15:11

## 2021-10-22 RX ADMIN — Medication 975 MILLIGRAM(S): at 08:40

## 2021-10-22 RX ADMIN — LISINOPRIL 5 MILLIGRAM(S): 2.5 TABLET ORAL at 06:25

## 2021-10-22 NOTE — DISCHARGE NOTE NURSING/CASE MANAGEMENT/SOCIAL WORK - PATIENT PORTAL LINK FT
You can access the FollowMyHealth Patient Portal offered by [facility] by registering at the following website: [email or web address]. By joining [facility]’s FollowMyHealth portal, you will also be able to view your health information using other applications (apps) compatible with our system.

## 2021-10-22 NOTE — PROGRESS NOTE ADULT - SUBJECTIVE AND OBJECTIVE BOX
ORTHOPAEDICS DAILY PROGRESS NOTE:       SUBJECTIVE/ROS: POD 1. Seen and examined. Pain well controlled. Hasn't been working with PT yet. Denies CP/SOB/N/V.         MEDICATIONS  (STANDING):  acetaminophen   Tablet .. 975 milliGRAM(s) Oral every 8 hours  acetaminophen  IVPB .. 1000 milliGRAM(s) IV Intermittent once  aspirin 325 milliGRAM(s) Oral two times a day  atorvastatin 10 milliGRAM(s) Oral at bedtime  benzocaine 15 mG/menthol 3.6 mG (Sugar-Free) Lozenge 1 Lozenge Oral at bedtime  ceFAZolin   IVPB 2000 milliGRAM(s) IV Intermittent once  celecoxib 200 milliGRAM(s) Oral every 12 hours  influenza   Vaccine 0.5 milliLiter(s) IntraMuscular once  ketorolac   Injectable 15 milliGRAM(s) IV Push every 6 hours  levothyroxine 25 MICROGram(s) Oral daily  lisinopril 5 milliGRAM(s) Oral daily  melatonin 3 milliGRAM(s) Oral at bedtime  pantoprazole    Tablet 40 milliGRAM(s) Oral before breakfast  polyethylene glycol 3350 17 Gram(s) Oral at bedtime  senna 2 Tablet(s) Oral at bedtime  sodium chloride 0.9%. 1000 milliLiter(s) (75 mL/Hr) IV Continuous <Continuous>    MEDICATIONS  (PRN):  magnesium hydroxide Suspension 30 milliLiter(s) Oral daily PRN Constipation  ondansetron Injectable 4 milliGRAM(s) IV Push every 6 hours PRN Nausea and/or Vomiting  oxyCODONE    IR 5 milliGRAM(s) Oral every 3 hours PRN Moderate Pain (4 - 6)  oxyCODONE    IR 10 milliGRAM(s) Oral every 3 hours PRN Severe Pain (7 - 10)  traMADol 50 milliGRAM(s) Oral every 6 hours PRN Mild Pain (1 - 3)      OBJECTIVE:    Vital Signs Last 24 Hrs  T(C): 36.6 (20 Oct 2021 04:17), Max: 36.9 (19 Oct 2021 07:41)  T(F): 97.8 (20 Oct 2021 04:17), Max: 98.4 (19 Oct 2021 07:41)  HR: 61 (20 Oct 2021 04:17) (60 - 84)  BP: 122/63 (20 Oct 2021 04:17) (122/63 - 180/83)  BP(mean): 111 (19 Oct 2021 12:45) (96 - 120)  RR: 18 (20 Oct 2021 04:17) (14 - 18)  SpO2: 96% (20 Oct 2021 04:17) (95% - 100%)    I&O's Detail    19 Oct 2021 07:01  -  20 Oct 2021 06:22  --------------------------------------------------------  IN:    IV PiggyBack: 100 mL    Oral Fluid: 960 mL  Total IN: 1060 mL    OUT:    Voided (mL): 1100 mL  Total OUT: 1100 mL    Total NET: -40 mL          Daily Height in cm: 147.32 (19 Oct 2021 07:55)    Daily     LABS:                        PHYSICAL EXAM:  NAD  nonlabored  LLE  dressing c/d/i  +gastroc/ta/ehl/fhl  silt s/s/sp/dp/t  +DP    
Orthopedic Surgery Progress Note    S: Patient seen and examined today. No acute events overnight. Difficulties with pain control overnight. Denies f/c/n/v.    O:  Vital Signs Last 24 Hrs  T(C): 36.7 (21 Oct 2021 04:20), Max: 37 (20 Oct 2021 22:25)  T(F): 98 (21 Oct 2021 04:20), Max: 98.6 (20 Oct 2021 22:25)  HR: 92 (21 Oct 2021 04:20) (56 - 93)  BP: 150/64 (21 Oct 2021 04:20) (117/64 - 196/79)  BP(mean): --  RR: 18 (21 Oct 2021 04:20) (17 - 18)  SpO2: 95% (21 Oct 2021 04:20) (94% - 96%)    Gen: NAD  LLE  Dressing clean, dry, intact  EHL/FHL/TA/GS intact  SILT DP/SP/Moore/Sa  WWP distally                          12.1   16.42 )-----------( 317      ( 20 Oct 2021 07:06 )             38.1       10-20    139  |  104  |  12  ----------------------------<  145<H>  4.1   |  24  |  0.60            
Orthopedic Surgery Progress Note    S: Patient seen and examined today. No acute events overnight. Pain is well controlled. Denies f/c, chest pain, sob, dizziness.    O:  Vital Signs Last 24 Hrs  T(C): 36.7 (22 Oct 2021 05:50), Max: 37.1 (21 Oct 2021 21:07)  T(F): 98 (22 Oct 2021 05:50), Max: 98.8 (22 Oct 2021 00:05)  HR: 75 (22 Oct 2021 05:50) (70 - 88)  BP: 157/72 (22 Oct 2021 05:50) (147/73 - 164/72)  BP(mean): --  RR: 18 (22 Oct 2021 05:50) (18 - 18)  SpO2: 95% (22 Oct 2021 05:50) (94% - 97%)    Gen: NAD  LLE  Dressing clean, dry, intact  EHL/FHL/TA/GS intact  SILT DP/SP/Moore/Sa  WWP distally                          12.1   16.42 )-----------( 317      ( 20 Oct 2021 07:06 )             38.1       10-20    139  |  104  |  12  ----------------------------<  145<H>  4.1   |  24  |  0.60

## 2021-10-22 NOTE — PROGRESS NOTE ADULT - ASSESSMENT
A/P 60y year old Female s/p  L TKA  Pain Control  WBAT  PT/OOB  DVT PPx  Dispo Planning: [facility] conner

## 2021-11-05 ENCOUNTER — APPOINTMENT (OUTPATIENT)
Dept: ORTHOPEDIC SURGERY | Facility: CLINIC | Age: 61
End: 2021-11-05
Payer: COMMERCIAL

## 2021-11-05 VITALS — HEIGHT: 59 IN | BODY MASS INDEX: 36.29 KG/M2 | WEIGHT: 180 LBS

## 2021-11-05 PROCEDURE — 99024 POSTOP FOLLOW-UP VISIT: CPT

## 2021-11-05 PROCEDURE — 73560 X-RAY EXAM OF KNEE 1 OR 2: CPT | Mod: LT

## 2021-11-10 NOTE — HISTORY OF PRESENT ILLNESS
[___ Weeks Post Op] : [unfilled] weeks post op [de-identified] : s/p Left TKR on 10/19/2021  [de-identified] : 61 y/o female s/p left TKR is overall doing well. She is ambulating in a wheel chair. She is taking ASA for DVT prophylaxis. She is going to start HOME PT soon.  [de-identified] : Dressing DC'd incision clean dry intact no redness erythema cellulitis [de-identified] : X-ray of the left knee 2 view shows knee replacement in good alignment. [de-identified] : Status post left knee replacement continue activity as tolerated range of motion strengthening NSAIDs therapy follow-up in 3 months

## 2021-12-08 RX ORDER — TRAMADOL HYDROCHLORIDE AND ACETAMINOPHEN 37.5; 325 MG/1; MG/1
37.5-325 TABLET, FILM COATED ORAL
Qty: 14 | Refills: 0 | Status: ACTIVE | COMMUNITY
Start: 2021-12-08 | End: 1900-01-01

## 2022-12-16 ENCOUNTER — APPOINTMENT (OUTPATIENT)
Dept: ORTHOPEDIC SURGERY | Facility: CLINIC | Age: 62
End: 2022-12-16

## 2022-12-16 PROCEDURE — 99214 OFFICE O/P EST MOD 30 MIN: CPT

## 2022-12-16 PROCEDURE — 73562 X-RAY EXAM OF KNEE 3: CPT | Mod: 50

## 2023-03-03 ENCOUNTER — OUTPATIENT (OUTPATIENT)
Dept: OUTPATIENT SERVICES | Facility: HOSPITAL | Age: 63
LOS: 1 days | End: 2023-03-03
Payer: COMMERCIAL

## 2023-03-03 VITALS
TEMPERATURE: 98 F | DIASTOLIC BLOOD PRESSURE: 86 MMHG | HEART RATE: 76 BPM | WEIGHT: 179.9 LBS | HEIGHT: 58 IN | SYSTOLIC BLOOD PRESSURE: 152 MMHG | RESPIRATION RATE: 18 BRPM | OXYGEN SATURATION: 96 %

## 2023-03-03 DIAGNOSIS — Z29.9 ENCOUNTER FOR PROPHYLACTIC MEASURES, UNSPECIFIED: ICD-10-CM

## 2023-03-03 DIAGNOSIS — Z01.818 ENCOUNTER FOR OTHER PREPROCEDURAL EXAMINATION: ICD-10-CM

## 2023-03-03 DIAGNOSIS — Z96.652 PRESENCE OF LEFT ARTIFICIAL KNEE JOINT: Chronic | ICD-10-CM

## 2023-03-03 DIAGNOSIS — M17.11 UNILATERAL PRIMARY OSTEOARTHRITIS, RIGHT KNEE: ICD-10-CM

## 2023-03-03 DIAGNOSIS — Z98.890 OTHER SPECIFIED POSTPROCEDURAL STATES: Chronic | ICD-10-CM

## 2023-03-03 LAB
ANION GAP SERPL CALC-SCNC: 11 MMOL/L — SIGNIFICANT CHANGE UP (ref 5–17)
BLD GP AB SCN SERPL QL: NEGATIVE — SIGNIFICANT CHANGE UP
BUN SERPL-MCNC: 14 MG/DL — SIGNIFICANT CHANGE UP (ref 7–23)
CALCIUM SERPL-MCNC: 9.9 MG/DL — SIGNIFICANT CHANGE UP (ref 8.4–10.5)
CHLORIDE SERPL-SCNC: 103 MMOL/L — SIGNIFICANT CHANGE UP (ref 96–108)
CO2 SERPL-SCNC: 27 MMOL/L — SIGNIFICANT CHANGE UP (ref 22–31)
CREAT SERPL-MCNC: 0.57 MG/DL — SIGNIFICANT CHANGE UP (ref 0.5–1.3)
EGFR: 101 ML/MIN/1.73M2 — SIGNIFICANT CHANGE UP
GLUCOSE SERPL-MCNC: 89 MG/DL — SIGNIFICANT CHANGE UP (ref 70–99)
HCT VFR BLD CALC: 42.5 % — SIGNIFICANT CHANGE UP (ref 34.5–45)
HGB BLD-MCNC: 13.2 G/DL — SIGNIFICANT CHANGE UP (ref 11.5–15.5)
MCHC RBC-ENTMCNC: 28.6 PG — SIGNIFICANT CHANGE UP (ref 27–34)
MCHC RBC-ENTMCNC: 31.1 GM/DL — LOW (ref 32–36)
MCV RBC AUTO: 92 FL — SIGNIFICANT CHANGE UP (ref 80–100)
NRBC # BLD: 0 /100 WBCS — SIGNIFICANT CHANGE UP (ref 0–0)
PLATELET # BLD AUTO: 327 K/UL — SIGNIFICANT CHANGE UP (ref 150–400)
POTASSIUM SERPL-MCNC: 4.3 MMOL/L — SIGNIFICANT CHANGE UP (ref 3.5–5.3)
POTASSIUM SERPL-SCNC: 4.3 MMOL/L — SIGNIFICANT CHANGE UP (ref 3.5–5.3)
RBC # BLD: 4.62 M/UL — SIGNIFICANT CHANGE UP (ref 3.8–5.2)
RBC # FLD: 12.6 % — SIGNIFICANT CHANGE UP (ref 10.3–14.5)
RH IG SCN BLD-IMP: POSITIVE — SIGNIFICANT CHANGE UP
SODIUM SERPL-SCNC: 141 MMOL/L — SIGNIFICANT CHANGE UP (ref 135–145)
WBC # BLD: 9.25 K/UL — SIGNIFICANT CHANGE UP (ref 3.8–10.5)
WBC # FLD AUTO: 9.25 K/UL — SIGNIFICANT CHANGE UP (ref 3.8–10.5)

## 2023-03-03 PROCEDURE — 83036 HEMOGLOBIN GLYCOSYLATED A1C: CPT

## 2023-03-03 PROCEDURE — 36415 COLL VENOUS BLD VENIPUNCTURE: CPT

## 2023-03-03 PROCEDURE — 86850 RBC ANTIBODY SCREEN: CPT

## 2023-03-03 PROCEDURE — 87641 MR-STAPH DNA AMP PROBE: CPT

## 2023-03-03 PROCEDURE — 80048 BASIC METABOLIC PNL TOTAL CA: CPT

## 2023-03-03 PROCEDURE — 85027 COMPLETE CBC AUTOMATED: CPT

## 2023-03-03 PROCEDURE — 86901 BLOOD TYPING SEROLOGIC RH(D): CPT

## 2023-03-03 PROCEDURE — G0463: CPT

## 2023-03-03 PROCEDURE — 87640 STAPH A DNA AMP PROBE: CPT

## 2023-03-03 PROCEDURE — 86900 BLOOD TYPING SEROLOGIC ABO: CPT

## 2023-03-03 RX ORDER — LIDOCAINE HCL 20 MG/ML
0.2 VIAL (ML) INJECTION ONCE
Refills: 0 | Status: DISCONTINUED | OUTPATIENT
Start: 2023-03-21 | End: 2023-03-21

## 2023-03-03 RX ORDER — PANTOPRAZOLE SODIUM 20 MG/1
40 TABLET, DELAYED RELEASE ORAL ONCE
Refills: 0 | Status: COMPLETED | OUTPATIENT
Start: 2023-03-21 | End: 2023-03-21

## 2023-03-03 RX ORDER — CHLORHEXIDINE GLUCONATE 213 G/1000ML
1 SOLUTION TOPICAL ONCE
Refills: 0 | Status: DISCONTINUED | OUTPATIENT
Start: 2023-03-21 | End: 2023-03-21

## 2023-03-03 RX ORDER — ATORVASTATIN CALCIUM 80 MG/1
1 TABLET, FILM COATED ORAL
Qty: 0 | Refills: 0 | DISCHARGE

## 2023-03-03 RX ORDER — CEFAZOLIN SODIUM 1 G
2000 VIAL (EA) INJECTION ONCE
Refills: 0 | Status: COMPLETED | OUTPATIENT
Start: 2023-03-21 | End: 2023-03-21

## 2023-03-03 RX ORDER — TRAMADOL HYDROCHLORIDE 50 MG/1
50 TABLET ORAL ONCE
Refills: 0 | Status: DISCONTINUED | OUTPATIENT
Start: 2023-03-21 | End: 2023-03-21

## 2023-03-03 RX ORDER — SODIUM CHLORIDE 9 MG/ML
3 INJECTION INTRAMUSCULAR; INTRAVENOUS; SUBCUTANEOUS EVERY 8 HOURS
Refills: 0 | Status: DISCONTINUED | OUTPATIENT
Start: 2023-03-21 | End: 2023-03-21

## 2023-03-03 RX ORDER — LISINOPRIL 2.5 MG/1
1 TABLET ORAL
Qty: 0 | Refills: 0 | DISCHARGE

## 2023-03-03 NOTE — H&P PST ADULT - PROBLEM SELECTOR PLAN 1
Scheduled for sx on 3/21/23. Surgical, chlorhexidine and Ensure Instructions provided to pt and spouse. COVID testing scheduled at [facility] on 3/18/23. Med eval on 3/6/23.

## 2023-03-03 NOTE — H&P PST ADULT - HISTORY OF PRESENT ILLNESS
61 year old female who presents for a scheduled Right Total Knee Arthroplasty Robot Assisted w/ [facility]AZ on 3/21/23. She is s/p left TKR on 10/19/2021 and no w/ c/o right knee pain. She is doing well with no issues of the left knee besides mild stiffness when she wakes up in the morning. Her right knee has been giving her pain, she indicates diffuse pain at all aspects of the knee. Worsening pain with walking and stairs. PMH of HTN, HLD, Obesity (BMI 37.6) and Hypothyroidism. Denies known hx of COVID, vaccinated. COVID testing scheduled at [facility] on 3/18/23. Med eval on 3/6/23. COVID19 PCR at [facility] on 3/18/23. Med eval on 3/6/23.

## 2023-03-03 NOTE — H&P PST ADULT - NSICDXPASTMEDICALHX_GEN_ALL_CORE_FT
PAST MEDICAL HISTORY:  Arthritis     High cholesterol     HTN (hypertension)     Hypothyroidism     Obesity     PVD (peripheral vascular disease)     Unilateral primary osteoarthritis, right knee

## 2023-03-09 PROBLEM — M17.11 UNILATERAL PRIMARY OSTEOARTHRITIS, RIGHT KNEE: Chronic | Status: ACTIVE | Noted: 2023-03-03

## 2023-03-10 ENCOUNTER — APPOINTMENT (OUTPATIENT)
Dept: ORTHOPEDIC SURGERY | Facility: CLINIC | Age: 63
End: 2023-03-10
Payer: COMMERCIAL

## 2023-03-10 VITALS — HEIGHT: 58 IN | BODY MASS INDEX: 38.5 KG/M2 | WEIGHT: 183.4 LBS

## 2023-03-10 DIAGNOSIS — M17.0 BILATERAL PRIMARY OSTEOARTHRITIS OF KNEE: ICD-10-CM

## 2023-03-10 PROCEDURE — 99215 OFFICE O/P EST HI 40 MIN: CPT

## 2023-03-14 PROBLEM — M17.0 PRIMARY LOCALIZED OSTEOARTHRITIS OF BOTH KNEES: Status: ACTIVE | Noted: 2019-09-05

## 2023-03-17 ENCOUNTER — OUTPATIENT (OUTPATIENT)
Dept: OUTPATIENT SERVICES | Facility: HOSPITAL | Age: 63
LOS: 1 days | End: 2023-03-17
Payer: COMMERCIAL

## 2023-03-17 ENCOUNTER — APPOINTMENT (OUTPATIENT)
Dept: CT IMAGING | Facility: CLINIC | Age: 63
End: 2023-03-17
Payer: COMMERCIAL

## 2023-03-17 DIAGNOSIS — M17.0 BILATERAL PRIMARY OSTEOARTHRITIS OF KNEE: ICD-10-CM

## 2023-03-17 DIAGNOSIS — Z96.652 PRESENCE OF LEFT ARTIFICIAL KNEE JOINT: Chronic | ICD-10-CM

## 2023-03-17 DIAGNOSIS — Z98.890 OTHER SPECIFIED POSTPROCEDURAL STATES: Chronic | ICD-10-CM

## 2023-03-17 PROCEDURE — 73700 CT LOWER EXTREMITY W/O DYE: CPT | Mod: 26,RT

## 2023-03-17 PROCEDURE — 73700 CT LOWER EXTREMITY W/O DYE: CPT

## 2023-03-18 ENCOUNTER — OUTPATIENT (OUTPATIENT)
Dept: OUTPATIENT SERVICES | Facility: HOSPITAL | Age: 63
LOS: 1 days | End: 2023-03-18
Payer: COMMERCIAL

## 2023-03-18 DIAGNOSIS — Z96.652 PRESENCE OF LEFT ARTIFICIAL KNEE JOINT: Chronic | ICD-10-CM

## 2023-03-18 DIAGNOSIS — Z11.52 ENCOUNTER FOR SCREENING FOR COVID-19: ICD-10-CM

## 2023-03-18 DIAGNOSIS — Z98.890 OTHER SPECIFIED POSTPROCEDURAL STATES: Chronic | ICD-10-CM

## 2023-03-18 LAB — SARS-COV-2 RNA SPEC QL NAA+PROBE: SIGNIFICANT CHANGE UP

## 2023-03-18 PROCEDURE — C9803: CPT

## 2023-03-18 PROCEDURE — U0005: CPT

## 2023-03-18 PROCEDURE — U0003: CPT

## 2023-03-20 ENCOUNTER — TRANSCRIPTION ENCOUNTER (OUTPATIENT)
Age: 63
End: 2023-03-20

## 2023-03-21 ENCOUNTER — APPOINTMENT (OUTPATIENT)
Dept: ORTHOPEDIC SURGERY | Facility: HOSPITAL | Age: 63
End: 2023-03-21

## 2023-03-21 ENCOUNTER — OUTPATIENT (OUTPATIENT)
Dept: INPATIENT UNIT | Facility: HOSPITAL | Age: 63
LOS: 1 days | End: 2023-03-21
Payer: COMMERCIAL

## 2023-03-21 VITALS
OXYGEN SATURATION: 97 % | HEIGHT: 58 IN | SYSTOLIC BLOOD PRESSURE: 148 MMHG | TEMPERATURE: 97 F | DIASTOLIC BLOOD PRESSURE: 77 MMHG | WEIGHT: 179.9 LBS | RESPIRATION RATE: 18 BRPM | HEART RATE: 74 BPM

## 2023-03-21 DIAGNOSIS — Z98.890 OTHER SPECIFIED POSTPROCEDURAL STATES: Chronic | ICD-10-CM

## 2023-03-21 DIAGNOSIS — Z96.652 PRESENCE OF LEFT ARTIFICIAL KNEE JOINT: Chronic | ICD-10-CM

## 2023-03-21 DIAGNOSIS — M17.11 UNILATERAL PRIMARY OSTEOARTHRITIS, RIGHT KNEE: ICD-10-CM

## 2023-03-21 LAB — GLUCOSE BLDC GLUCOMTR-MCNC: 104 MG/DL — HIGH (ref 70–99)

## 2023-03-21 PROCEDURE — 73560 X-RAY EXAM OF KNEE 1 OR 2: CPT | Mod: 26,RT

## 2023-03-21 DEVICE — CEMENT SIMPLEX P 40GM: Type: IMPLANTABLE DEVICE | Site: RIGHT | Status: FUNCTIONAL

## 2023-03-21 DEVICE — MAKO BONE PIN 3.2MM X 140MM: Type: IMPLANTABLE DEVICE | Site: RIGHT | Status: FUNCTIONAL

## 2023-03-21 DEVICE — CEMENT SIMPLEX HV: Type: IMPLANTABLE DEVICE | Site: RIGHT | Status: FUNCTIONAL

## 2023-03-21 DEVICE — INSERT TIB BEARING CS X3 SZ 2 10MM: Type: IMPLANTABLE DEVICE | Site: RIGHT | Status: FUNCTIONAL

## 2023-03-21 DEVICE — PATELLA ASYMMETRIC TRIATHLON 29MM: Type: IMPLANTABLE DEVICE | Site: RIGHT | Status: FUNCTIONAL

## 2023-03-21 DEVICE — COMP FEM TRIATHLON CR SZ 2 RT: Type: IMPLANTABLE DEVICE | Site: RIGHT | Status: FUNCTIONAL

## 2023-03-21 DEVICE — BASEPLATE TIB TRIATHLON TRITAN SZ 2: Type: IMPLANTABLE DEVICE | Site: RIGHT | Status: FUNCTIONAL

## 2023-03-21 DEVICE — MAKO BONE PIN 3.2MM X 110MM: Type: IMPLANTABLE DEVICE | Site: RIGHT | Status: FUNCTIONAL

## 2023-03-21 DEVICE — COMP FEM CR CMNTLSS BEADED W/ PA SZ 2 RT: Type: IMPLANTABLE DEVICE | Site: RIGHT | Status: FUNCTIONAL

## 2023-03-21 RX ORDER — MAGNESIUM HYDROXIDE 400 MG/1
30 TABLET, CHEWABLE ORAL DAILY
Refills: 0 | Status: DISCONTINUED | OUTPATIENT
Start: 2023-03-21 | End: 2023-04-04

## 2023-03-21 RX ORDER — ASPIRIN/CALCIUM CARB/MAGNESIUM 324 MG
325 TABLET ORAL EVERY 12 HOURS
Refills: 0 | Status: DISCONTINUED | OUTPATIENT
Start: 2023-03-21 | End: 2023-03-21

## 2023-03-21 RX ORDER — SENNA PLUS 8.6 MG/1
2 TABLET ORAL AT BEDTIME
Refills: 0 | Status: DISCONTINUED | OUTPATIENT
Start: 2023-03-21 | End: 2023-04-04

## 2023-03-21 RX ORDER — ONDANSETRON 8 MG/1
4 TABLET, FILM COATED ORAL ONCE
Refills: 0 | Status: DISCONTINUED | OUTPATIENT
Start: 2023-03-21 | End: 2023-03-21

## 2023-03-21 RX ORDER — SODIUM CHLORIDE 9 MG/ML
500 INJECTION INTRAMUSCULAR; INTRAVENOUS; SUBCUTANEOUS ONCE
Refills: 0 | Status: COMPLETED | OUTPATIENT
Start: 2023-03-21 | End: 2023-03-21

## 2023-03-21 RX ORDER — CELECOXIB 200 MG/1
200 CAPSULE ORAL
Refills: 0 | Status: DISCONTINUED | OUTPATIENT
Start: 2023-03-22 | End: 2023-03-22

## 2023-03-21 RX ORDER — ACETAMINOPHEN 500 MG
975 TABLET ORAL EVERY 8 HOURS
Refills: 0 | Status: DISCONTINUED | OUTPATIENT
Start: 2023-03-22 | End: 2023-04-04

## 2023-03-21 RX ORDER — SODIUM CHLORIDE 9 MG/ML
500 INJECTION INTRAMUSCULAR; INTRAVENOUS; SUBCUTANEOUS ONCE
Refills: 0 | Status: COMPLETED | OUTPATIENT
Start: 2023-03-22 | End: 2023-03-22

## 2023-03-21 RX ORDER — CELECOXIB 200 MG/1
200 CAPSULE ORAL EVERY 12 HOURS
Refills: 0 | Status: DISCONTINUED | OUTPATIENT
Start: 2023-03-22 | End: 2023-03-22

## 2023-03-21 RX ORDER — LISINOPRIL 2.5 MG/1
1 TABLET ORAL
Qty: 0 | Refills: 0 | DISCHARGE

## 2023-03-21 RX ORDER — CEFAZOLIN SODIUM 1 G
2000 VIAL (EA) INJECTION EVERY 8 HOURS
Refills: 0 | Status: COMPLETED | OUTPATIENT
Start: 2023-03-21 | End: 2023-03-22

## 2023-03-21 RX ORDER — TRAMADOL HYDROCHLORIDE 50 MG/1
50 TABLET ORAL EVERY 6 HOURS
Refills: 0 | Status: DISCONTINUED | OUTPATIENT
Start: 2023-03-21 | End: 2023-03-22

## 2023-03-21 RX ORDER — OXYBUTYNIN CHLORIDE 5 MG
5 TABLET ORAL THREE TIMES A DAY
Refills: 0 | Status: DISCONTINUED | OUTPATIENT
Start: 2023-03-21 | End: 2023-03-21

## 2023-03-21 RX ORDER — ACETAMINOPHEN 500 MG
1000 TABLET ORAL ONCE
Refills: 0 | Status: COMPLETED | OUTPATIENT
Start: 2023-03-22 | End: 2023-03-22

## 2023-03-21 RX ORDER — ACETAMINOPHEN 500 MG
1000 TABLET ORAL ONCE
Refills: 0 | Status: COMPLETED | OUTPATIENT
Start: 2023-03-21 | End: 2023-03-21

## 2023-03-21 RX ORDER — ATORVASTATIN CALCIUM 80 MG/1
10 TABLET, FILM COATED ORAL AT BEDTIME
Refills: 0 | Status: DISCONTINUED | OUTPATIENT
Start: 2023-03-21 | End: 2023-03-21

## 2023-03-21 RX ORDER — KETOROLAC TROMETHAMINE 30 MG/ML
15 SYRINGE (ML) INJECTION EVERY 6 HOURS
Refills: 0 | Status: DISCONTINUED | OUTPATIENT
Start: 2023-03-21 | End: 2023-03-21

## 2023-03-21 RX ORDER — POLYETHYLENE GLYCOL 3350 17 G/17G
17 POWDER, FOR SOLUTION ORAL AT BEDTIME
Refills: 0 | Status: DISCONTINUED | OUTPATIENT
Start: 2023-03-21 | End: 2023-04-04

## 2023-03-21 RX ORDER — PANTOPRAZOLE SODIUM 20 MG/1
40 TABLET, DELAYED RELEASE ORAL
Refills: 0 | Status: DISCONTINUED | OUTPATIENT
Start: 2023-03-22 | End: 2023-04-04

## 2023-03-21 RX ORDER — LISINOPRIL 2.5 MG/1
10 TABLET ORAL DAILY
Refills: 0 | Status: DISCONTINUED | OUTPATIENT
Start: 2023-03-21 | End: 2023-04-04

## 2023-03-21 RX ORDER — KETOROLAC TROMETHAMINE 30 MG/ML
15 SYRINGE (ML) INJECTION EVERY 6 HOURS
Refills: 0 | Status: DISCONTINUED | OUTPATIENT
Start: 2023-03-21 | End: 2023-03-22

## 2023-03-21 RX ORDER — ASPIRIN/CALCIUM CARB/MAGNESIUM 324 MG
325 TABLET ORAL EVERY 12 HOURS
Refills: 0 | Status: DISCONTINUED | OUTPATIENT
Start: 2023-03-21 | End: 2023-04-04

## 2023-03-21 RX ORDER — HYDROMORPHONE HYDROCHLORIDE 2 MG/ML
0.5 INJECTION INTRAMUSCULAR; INTRAVENOUS; SUBCUTANEOUS
Refills: 0 | Status: DISCONTINUED | OUTPATIENT
Start: 2023-03-21 | End: 2023-03-21

## 2023-03-21 RX ORDER — ONDANSETRON 8 MG/1
4 TABLET, FILM COATED ORAL EVERY 6 HOURS
Refills: 0 | Status: DISCONTINUED | OUTPATIENT
Start: 2023-03-21 | End: 2023-04-04

## 2023-03-21 RX ORDER — DEXAMETHASONE 0.5 MG/5ML
8 ELIXIR ORAL ONCE
Refills: 0 | Status: COMPLETED | OUTPATIENT
Start: 2023-03-22 | End: 2023-03-22

## 2023-03-21 RX ORDER — HYDROMORPHONE HYDROCHLORIDE 2 MG/ML
0.5 INJECTION INTRAMUSCULAR; INTRAVENOUS; SUBCUTANEOUS ONCE
Refills: 0 | Status: DISCONTINUED | OUTPATIENT
Start: 2023-03-21 | End: 2023-03-21

## 2023-03-21 RX ORDER — SODIUM CHLORIDE 9 MG/ML
1000 INJECTION, SOLUTION INTRAVENOUS
Refills: 0 | Status: DISCONTINUED | OUTPATIENT
Start: 2023-03-21 | End: 2023-03-21

## 2023-03-21 RX ORDER — OXYCODONE HYDROCHLORIDE 5 MG/1
5 TABLET ORAL EVERY 4 HOURS
Refills: 0 | Status: DISCONTINUED | OUTPATIENT
Start: 2023-03-21 | End: 2023-03-21

## 2023-03-21 RX ORDER — OXYCODONE HYDROCHLORIDE 5 MG/1
10 TABLET ORAL EVERY 4 HOURS
Refills: 0 | Status: DISCONTINUED | OUTPATIENT
Start: 2023-03-21 | End: 2023-03-22

## 2023-03-21 RX ORDER — LEVOTHYROXINE SODIUM 125 MCG
25 TABLET ORAL DAILY
Refills: 0 | Status: DISCONTINUED | OUTPATIENT
Start: 2023-03-21 | End: 2023-04-04

## 2023-03-21 RX ORDER — METFORMIN HYDROCHLORIDE 850 MG/1
500 TABLET ORAL AT BEDTIME
Refills: 0 | Status: DISCONTINUED | OUTPATIENT
Start: 2023-03-21 | End: 2023-03-21

## 2023-03-21 RX ORDER — LISINOPRIL 2.5 MG/1
10 TABLET ORAL ONCE
Refills: 0 | Status: DISCONTINUED | OUTPATIENT
Start: 2023-03-21 | End: 2023-03-21

## 2023-03-21 RX ORDER — LEVOTHYROXINE SODIUM 125 MCG
1 TABLET ORAL
Qty: 0 | Refills: 0 | DISCHARGE

## 2023-03-21 RX ADMIN — OXYCODONE HYDROCHLORIDE 10 MILLIGRAM(S): 5 TABLET ORAL at 22:43

## 2023-03-21 RX ADMIN — PANTOPRAZOLE SODIUM 40 MILLIGRAM(S): 20 TABLET, DELAYED RELEASE ORAL at 13:53

## 2023-03-21 RX ADMIN — HYDROMORPHONE HYDROCHLORIDE 0.5 MILLIGRAM(S): 2 INJECTION INTRAMUSCULAR; INTRAVENOUS; SUBCUTANEOUS at 18:59

## 2023-03-21 RX ADMIN — Medication 400 MILLIGRAM(S): at 23:18

## 2023-03-21 RX ADMIN — TRAMADOL HYDROCHLORIDE 50 MILLIGRAM(S): 50 TABLET ORAL at 13:52

## 2023-03-21 RX ADMIN — Medication 15 MILLIGRAM(S): at 23:18

## 2023-03-21 RX ADMIN — HYDROMORPHONE HYDROCHLORIDE 0.5 MILLIGRAM(S): 2 INJECTION INTRAMUSCULAR; INTRAVENOUS; SUBCUTANEOUS at 18:58

## 2023-03-21 RX ADMIN — Medication 325 MILLIGRAM(S): at 20:32

## 2023-03-21 RX ADMIN — OXYCODONE HYDROCHLORIDE 10 MILLIGRAM(S): 5 TABLET ORAL at 21:43

## 2023-03-21 RX ADMIN — Medication 100 MILLIGRAM(S): at 23:19

## 2023-03-21 RX ADMIN — Medication 15 MILLIGRAM(S): at 17:48

## 2023-03-21 RX ADMIN — Medication 15 MILLIGRAM(S): at 17:47

## 2023-03-21 RX ADMIN — LISINOPRIL 10 MILLIGRAM(S): 2.5 TABLET ORAL at 18:47

## 2023-03-21 RX ADMIN — SODIUM CHLORIDE 500 MILLILITER(S): 9 INJECTION INTRAMUSCULAR; INTRAVENOUS; SUBCUTANEOUS at 17:07

## 2023-03-21 NOTE — PROVIDER CONTACT NOTE (OTHER) - ASSESSMENT
Pt A&OX4. VS, /70, HR 64. Pt denies chest pain/sob. [id number] given for 7/10 pain. Pt scheduled for 500cc floor bolus.

## 2023-03-21 NOTE — PHYSICAL THERAPY INITIAL EVALUATION ADULT - PERTINENT HX OF CURRENT PROBLEM, REHAB EVAL
62 year old female who presents for a scheduled Right Total Knee Arthroplasty Robot Assisted w/ [facility]AZ on 3/21/23. She is s/p left TKR on 10/19/2021 and no w/ c/o right knee pain. She is doing well with no issues of the left knee besides mild stiffness when she wakes up in the morning. Her right knee has been giving her pain, she indicates diffuse pain at all aspects of the knee. Worsening pain with walking and stairs. PMH of HTN, HLD, Obesity (BMI 37.6) and Hypothyroidism. Denies known hx of COVID, vaccinated. COVID testing scheduled at [facility] on 3/18/23. Med eval on 3/6/23. COVID19 PCR at [facility] on 3/18/23. Med eval on 3/6/23.
100

## 2023-03-21 NOTE — PHYSICAL THERAPY INITIAL EVALUATION ADULT - GENERAL OBSERVATIONS, REHAB EVAL
Pt rec'd semisupine in bed, in NAD, +PACU monitoring, +IV, +RLE ace wrap. Agreeable to PT. RN cleared pt to be seen.

## 2023-03-21 NOTE — PRE-ANESTHESIA EVALUATION ADULT - NSANTHPMHFT_GEN_ALL_CORE
Pt 1day s/p nausea, vomiting, and diarrhea. No fever at this time. The patient no longer has any symptoms. I discussed my concerns with Dr Ibrahim, and he would like to proceed.

## 2023-03-21 NOTE — PHYSICAL THERAPY INITIAL EVALUATION ADULT - GAIT DEVIATIONS NOTED, PT EVAL
decreased ramya/increased time in double stance/decreased step length/decreased stride length/decreased weight-shifting ability

## 2023-03-21 NOTE — PHYSICAL THERAPY INITIAL EVALUATION ADULT - ADDITIONAL COMMENTS
Per pt, she lives in a house with  and children. Has 4 steps to enter (+HR) and 4 steps inside (+HR). Reports being independent with functional mobility/ADLs without AD. Owns RW, cane, and shower chair.

## 2023-03-21 NOTE — PHYSICAL THERAPY INITIAL EVALUATION ADULT - ACTIVE RANGE OF MOTION EXAMINATION, REHAB EVAL
RLE ankle/hip WFL; unable to formally assess R knee 2/2 ace wrap limiting mobility. Pt with at least 0-75 degrees of flx/bilateral upper extremity Active ROM was WFL (within functional limits)/Left LE Active ROM was WFL (within functional limits)

## 2023-03-21 NOTE — PHYSICAL THERAPY INITIAL EVALUATION ADULT - GAIT TRAINING, PT EVAL
well developed, well nourished , in no acute distress , ambulating without difficulty , normal communication ability Goal: Pt will ambulate 250 feet independently with appropriate device in 3 weeks.

## 2023-03-21 NOTE — PATIENT PROFILE ADULT - FALL HARM RISK - HARM RISK INTERVENTIONS

## 2023-03-21 NOTE — PROVIDER CONTACT NOTE (OTHER) - ACTION/TREATMENT ORDERED:
ERNESTINE Gamble made aware; ordered to hold bolus and continue to monitor. No other interventions needed at this time.

## 2023-03-21 NOTE — CHART NOTE - NSCHARTNOTEFT_GEN_A_CORE
ORTHOPEDIC POST OPERATIVE CHECK    Patient evaluated in the RR. Resting without complaints.  No Chest Pain, SOB, N/V. Reports moderate pain at this time.     T(C): 36.3 (03-21-23 @ 16:36), Max: 36.3 (03-21-23 @ 12:35)  HR: 74 (03-21-23 @ 18:47) (74 - 101)  BP: 175/81 (03-21-23 @ 18:47) (148/77 - 180/72)  RR: 18 (03-21-23 @ 18:47) (18 - 20)  SpO2: 100% (03-21-23 @ 18:47) (94% - 100%)  Wt(kg): --    Exam:  Alert and [place], No Acute Distress  Card: +S1/S2, RRR  Pulm: CTAB  Laterality:  RLE: Aquacel and ace bandage compression dressing clean dry and intact.   Calves soft, non-tender bilaterally  +PF/DF/EHL/FHL  SILT  + DP pulse    Xray: in chart, stable total knee replacement with no sign of mechanical failure.              A/P: 62yFemale S/pR Total Knee Arthroplasty. VSS. NAD  -PT/OT-WBAT with rolling walker.   -IS encouraged  -OOB  -DVT PPx: ecotrin 325 BID  -GI PPx: protonix  -Pain Control  -Continue Current Tx  -FU AM labs  -Dispo planning pending PT/OT eval.     Alicia Rogers PA-C  Orthopedic Surgery  Team Pager: #[phone]

## 2023-03-22 ENCOUNTER — TRANSCRIPTION ENCOUNTER (OUTPATIENT)
Age: 63
End: 2023-03-22

## 2023-03-22 LAB
ANION GAP SERPL CALC-SCNC: 12 MMOL/L — SIGNIFICANT CHANGE UP (ref 5–17)
BUN SERPL-MCNC: 9 MG/DL — SIGNIFICANT CHANGE UP (ref 7–23)
CALCIUM SERPL-MCNC: 8.4 MG/DL — SIGNIFICANT CHANGE UP (ref 8.4–10.5)
CHLORIDE SERPL-SCNC: 102 MMOL/L — SIGNIFICANT CHANGE UP (ref 96–108)
CO2 SERPL-SCNC: 25 MMOL/L — SIGNIFICANT CHANGE UP (ref 22–31)
CREAT SERPL-MCNC: 0.56 MG/DL — SIGNIFICANT CHANGE UP (ref 0.5–1.3)
EGFR: 102 ML/MIN/1.73M2 — SIGNIFICANT CHANGE UP
GLUCOSE SERPL-MCNC: 138 MG/DL — HIGH (ref 70–99)
HCT VFR BLD CALC: 36.6 % — SIGNIFICANT CHANGE UP (ref 34.5–45)
HGB BLD-MCNC: 11.6 G/DL — SIGNIFICANT CHANGE UP (ref 11.5–15.5)
MCHC RBC-ENTMCNC: 28.6 PG — SIGNIFICANT CHANGE UP (ref 27–34)
MCHC RBC-ENTMCNC: 31.7 GM/DL — LOW (ref 32–36)
MCV RBC AUTO: 90.1 FL — SIGNIFICANT CHANGE UP (ref 80–100)
NRBC # BLD: 0 /100 WBCS — SIGNIFICANT CHANGE UP (ref 0–0)
PLATELET # BLD AUTO: 301 K/UL — SIGNIFICANT CHANGE UP (ref 150–400)
POTASSIUM SERPL-MCNC: 3.7 MMOL/L — SIGNIFICANT CHANGE UP (ref 3.5–5.3)
POTASSIUM SERPL-SCNC: 3.7 MMOL/L — SIGNIFICANT CHANGE UP (ref 3.5–5.3)
RBC # BLD: 4.06 M/UL — SIGNIFICANT CHANGE UP (ref 3.8–5.2)
RBC # FLD: 12.9 % — SIGNIFICANT CHANGE UP (ref 10.3–14.5)
SODIUM SERPL-SCNC: 139 MMOL/L — SIGNIFICANT CHANGE UP (ref 135–145)
WBC # BLD: 12.3 K/UL — HIGH (ref 3.8–10.5)
WBC # FLD AUTO: 12.3 K/UL — HIGH (ref 3.8–10.5)

## 2023-03-22 RX ORDER — TRAMADOL HYDROCHLORIDE 50 MG/1
1 TABLET ORAL
Qty: 0 | Refills: 0 | DISCHARGE
Start: 2023-03-22

## 2023-03-22 RX ORDER — NALOXONE HYDROCHLORIDE 4 MG/.1ML
4 SPRAY NASAL
Qty: 1 | Refills: 0
Start: 2023-03-22

## 2023-03-22 RX ORDER — OXYCODONE HYDROCHLORIDE 5 MG/1
1 TABLET ORAL
Qty: 0 | Refills: 0 | DISCHARGE
Start: 2023-03-22

## 2023-03-22 RX ORDER — PANTOPRAZOLE SODIUM 20 MG/1
1 TABLET, DELAYED RELEASE ORAL
Qty: 30 | Refills: 0
Start: 2023-03-22 | End: 2023-04-20

## 2023-03-22 RX ORDER — OXYCODONE HYDROCHLORIDE 5 MG/1
1 TABLET ORAL
Qty: 42 | Refills: 0
Start: 2023-03-22 | End: 2023-03-28

## 2023-03-22 RX ORDER — SIMETHICONE 80 MG/1
80 TABLET, CHEWABLE ORAL
Refills: 0 | Status: DISCONTINUED | OUTPATIENT
Start: 2023-03-22 | End: 2023-03-22

## 2023-03-22 RX ORDER — CELECOXIB 200 MG/1
200 CAPSULE ORAL EVERY 12 HOURS
Refills: 0 | Status: DISCONTINUED | OUTPATIENT
Start: 2023-03-23 | End: 2023-04-04

## 2023-03-22 RX ORDER — TRAMADOL HYDROCHLORIDE 50 MG/1
1 TABLET ORAL
Qty: 28 | Refills: 0
Start: 2023-03-22 | End: 2023-03-28

## 2023-03-22 RX ORDER — SENNA PLUS 8.6 MG/1
2 TABLET ORAL
Qty: 0 | Refills: 0 | DISCHARGE
Start: 2023-03-22

## 2023-03-22 RX ORDER — DOCUSATE SODIUM 100 MG
1 CAPSULE ORAL
Qty: 14 | Refills: 0
Start: 2023-03-22 | End: 2023-03-28

## 2023-03-22 RX ORDER — ACETAMINOPHEN 500 MG
3 TABLET ORAL
Qty: 126 | Refills: 0
Start: 2023-03-22 | End: 2023-04-04

## 2023-03-22 RX ORDER — PANTOPRAZOLE SODIUM 20 MG/1
1 TABLET, DELAYED RELEASE ORAL
Qty: 0 | Refills: 0 | DISCHARGE
Start: 2023-03-22

## 2023-03-22 RX ORDER — SENNA PLUS 8.6 MG/1
2 TABLET ORAL
Qty: 14 | Refills: 0
Start: 2023-03-22 | End: 2023-03-28

## 2023-03-22 RX ORDER — ASPIRIN/CALCIUM CARB/MAGNESIUM 324 MG
1 TABLET ORAL
Qty: 0 | Refills: 0 | DISCHARGE
Start: 2023-03-22

## 2023-03-22 RX ORDER — ASPIRIN/CALCIUM CARB/MAGNESIUM 324 MG
1 TABLET ORAL
Qty: 84 | Refills: 0
Start: 2023-03-22 | End: 2023-05-02

## 2023-03-22 RX ADMIN — Medication 1 TABLET(S): at 11:19

## 2023-03-22 RX ADMIN — Medication 15 MILLIGRAM(S): at 17:03

## 2023-03-22 RX ADMIN — Medication 15 MILLIGRAM(S): at 00:19

## 2023-03-22 RX ADMIN — Medication 1000 MILLIGRAM(S): at 07:35

## 2023-03-22 RX ADMIN — Medication 1000 MILLIGRAM(S): at 00:19

## 2023-03-22 RX ADMIN — OXYCODONE HYDROCHLORIDE 10 MILLIGRAM(S): 5 TABLET ORAL at 05:21

## 2023-03-22 RX ADMIN — LISINOPRIL 10 MILLIGRAM(S): 2.5 TABLET ORAL at 08:52

## 2023-03-22 RX ADMIN — SIMETHICONE 80 MILLIGRAM(S): 80 TABLET, CHEWABLE ORAL at 17:49

## 2023-03-22 RX ADMIN — Medication 400 MILLIGRAM(S): at 07:05

## 2023-03-22 RX ADMIN — Medication 325 MILLIGRAM(S): at 08:52

## 2023-03-22 RX ADMIN — Medication 100 MILLIGRAM(S): at 05:20

## 2023-03-22 RX ADMIN — TRAMADOL HYDROCHLORIDE 50 MILLIGRAM(S): 50 TABLET ORAL at 00:21

## 2023-03-22 RX ADMIN — Medication 325 MILLIGRAM(S): at 21:25

## 2023-03-22 RX ADMIN — Medication 15 MILLIGRAM(S): at 06:41

## 2023-03-22 RX ADMIN — OXYCODONE HYDROCHLORIDE 10 MILLIGRAM(S): 5 TABLET ORAL at 06:30

## 2023-03-22 RX ADMIN — Medication 15 MILLIGRAM(S): at 11:19

## 2023-03-22 RX ADMIN — Medication 101.6 MILLIGRAM(S): at 05:21

## 2023-03-22 RX ADMIN — Medication 15 MILLIGRAM(S): at 05:21

## 2023-03-22 RX ADMIN — Medication 975 MILLIGRAM(S): at 15:32

## 2023-03-22 RX ADMIN — SODIUM CHLORIDE 500 MILLILITER(S): 9 INJECTION INTRAMUSCULAR; INTRAVENOUS; SUBCUTANEOUS at 05:23

## 2023-03-22 RX ADMIN — PANTOPRAZOLE SODIUM 40 MILLIGRAM(S): 20 TABLET, DELAYED RELEASE ORAL at 05:21

## 2023-03-22 RX ADMIN — Medication 25 MICROGRAM(S): at 05:21

## 2023-03-22 RX ADMIN — Medication 30 MILLILITER(S): at 23:04

## 2023-03-22 RX ADMIN — TRAMADOL HYDROCHLORIDE 50 MILLIGRAM(S): 50 TABLET ORAL at 01:16

## 2023-03-22 NOTE — OCCUPATIONAL THERAPY INITIAL EVALUATION ADULT - RANGE OF MOTION EXAMINATION, LOWER EXTREMITY
RLE limited due to cast/Left LE Active ROM was WNL  (within normal limits)/Right LE Active ROM was WFL   (within functional limits) RLE limited due to ace wrap/Left LE Active ROM was WNL  (within normal limits)/Right LE Active ROM was WFL   (within functional limits)

## 2023-03-22 NOTE — DISCHARGE NOTE PROVIDER - NSDCMRMEDTOKEN_GEN_ALL_CORE_FT
acetaminophen 325 mg oral tablet: 3 tab(s) orally every 8 hours, standing x 1-2 weeks then as needed for pain  aspirin 325 mg oral delayed release tablet: 1 tab(s) orally every 12 hours x 6 weeks for DVT ppx  Colace 100 mg oral capsule: 1 cap(s) orally 2 times a day, As Needed -for constipation   levothyroxine 25 mcg (0.025 mg) oral capsule: 1 cap(s) orally once a day  lisinopril 10 mg oral tablet: 1 tab(s) orally once a day  Multiple Vitamins oral tablet: 1 tab(s) orally once a day  naloxone 4 mg/0.1 mL nasal spray: 4 milligram(s) intranasally 1 spray q2-3 min, alternating between nostrils  naproxen 500 mg oral tablet: 1 tab(s) orally 2 times a day x 1 week, then as needed for inflammation  oxyCODONE 5 mg oral tablet: 1 tab(s) orally every 4 hours, As needed, severe Pain (7 - 10) MDD:006  pantoprazole 40 mg oral delayed release tablet: 1 tab(s) orally once a day (before a meal)  senna leaf extract oral tablet: 2 tab(s) orally once a day (at bedtime), As Needed -for constipation   traMADol 50 mg oral tablet: 1 tab(s) orally every 6 hours, As needed, Moderate Pain (4 - 6) MDD:004

## 2023-03-22 NOTE — DISCHARGE NOTE PROVIDER - CARE PROVIDER_API CALL
Rishabh Carballo)  Orthopaedic Surgery  [address] [place] [address]  Phone: [phone]  Fax: [phone]  Established Patient  Follow Up Time: 2 weeks

## 2023-03-22 NOTE — DISCHARGE NOTE PROVIDER - HOSPITAL COURSE
History of Present Illness:  61 year old female who presents for a scheduled Right Total Knee Arthroplasty Robot Assisted w/ [facility] on 3/21/23. She is s/p left TKR on 10/19/2021 and no w/ c/o right knee pain. She is doing well with no issues of the left knee besides mild stiffness when she wakes up in the morning. Her right knee has been giving her pain, she indicates diffuse pain at all aspects of the knee. Worsening pain with walking and stairs. PMH of HTN, HLD, Obesity (BMI 37.6) and Hypothyroidism. Denies known hx of COVID, vaccinated. COVID testing scheduled at [facility] on 3/18/23. Med eval on 3/6/23. COVID19 PCR at [facility] on 3/18/23. Med eval on 3/6/23.    Goal of Care: "Want to walk properly and have less pain"     PAST MEDICAL HISTORY:  Arthritis   High cholesterol   HTN (hypertension)   Hypothyroidism   Obesity   PVD (peripheral vascular disease)   Unilateral primary osteoarthritis, right knee.     PAST SURGICAL HISTORY:  H/O umbilical hernia repair   History of total left knee replacement.     Hospital Course:  After admission on 3/22/23 and receiving pre-operative parenteral prophylactic antibiotics, the patient underwent an uncomplicated Right  total knee replacement with CHAZ robotic assist with Dr. Carballo.  Patient tolerated the procedure well and was transferred to the recovery room in stable condition, with a stable neuro / vascular exam of the operated extremity.    Patient was placed on ecotrin 325 BID x 6 weeks for DVT ppx, and was placed on Protonix for GI protection.   Patient was made weight bearing as tolerated with the operative leg.     Patient evaluated by PT/OT and recommended for disposition for home with home PT.     Discharge and Orthopedic Care instructions were delineated in the Discharge Plan and reviewed with the patient. All medications were delineated in the medication reconciliation tool and key points were reviewed with the patient. They were deemed stable from an Orthopedic & medical standpoint for discharge.

## 2023-03-22 NOTE — PROGRESS NOTE ADULT - SUBJECTIVE AND OBJECTIVE BOX
Ortho Progress Note    S: Patient seen and examined. No acute events overnight. Pain well controlled with current regimen. Denies lightheadedness/dizziness, CP/SOB. Tolerating diet.       O:  Physical Exam:  Gen: Laying in bed, NAD, alert and oriented.   Resp: Unlabored breathing  RLExt: Dressing c/d/i  EHL/FHL/TA/Sol intact          + SILT DP/SP/DANIEL/Sa/T          +DP, extremity WWP    Vital Signs Last 24 Hrs  T(C): 36.8 (22 Mar 2023 04:33), Max: 36.8 (21 Mar 2023 22:30)  T(F): 98.2 (22 Mar 2023 04:33), Max: 98.2 (21 Mar 2023 22:30)  HR: 65 (22 Mar 2023 04:33) (62 - 101)  BP: 131/58 (22 Mar 2023 04:33) (129/65 - 181/81)  BP(mean): 93 (21 Mar 2023 20:30) (93 - 116)  RR: 18 (22 Mar 2023 04:33) (15 - 20)  SpO2: 94% (22 Mar 2023 04:33) (92% - 100%)    Parameters below as of 22 Mar 2023 04:33  Patient On (Oxygen Delivery Method): room air        A/P: 62F s/p R TKA 3/21    Neuro: Pain control  Resp: IS  GI: Regular diet, bowel reg  MSK: WBAT, PT/OT  Heme: DVT PPX: A325 BID  Dispo: Home today

## 2023-03-22 NOTE — OCCUPATIONAL THERAPY INITIAL EVALUATION ADULT - GENERAL OBSERVATIONS, REHAB EVAL
Pt was received semi-supine in bed, +IVL Pt was received semi-supine in bed, +IVL, Pt was received semi-supine in bed, +IVL, +ace wrap

## 2023-03-22 NOTE — DISCHARGE NOTE PROVIDER - NSDCFUADDINST_GEN_ALL_CORE_FT
Please call to schedule your post-operative follow up appointment with Dr. Carballo for 2 weeks after hospital discharge.   Please follow instructions on discharge pamphlet provided.   Keep dressing clean, dry, and intact. Have doctor remove bandage at your post-operative follow up appointment.   Shower: with assistance beginning on POD #5. keep the dressing away from the stream of water. Pat the dressing dry when coming out of the shower. no tub baths.   Continue to ambulate as tolerated to the operative leg with a rolling walker.   Continue to take Ecotrin (Aspirin) 325 mg twice daily x 6 weeks to help prevent blood clots. Please continue taking Protonix 40mg daily while taking aspirin for gastrointestinal protection.   Recommended to follow up with your primary care provider within 1-2 months of hospital discharge to discuss your recent surgery and any change to your medications.

## 2023-03-22 NOTE — DISCHARGE NOTE NURSING/CASE MANAGEMENT/SOCIAL WORK - PATIENT PORTAL LINK FT
You can access the FollowMyHealth Patient Portal offered by [facility] by registering at the following website: [email or web address]. By joining [employer]’s FollowMyHealth portal, you will also be able to view your health information using other applications (apps) compatible with our system.

## 2023-03-22 NOTE — OCCUPATIONAL THERAPY INITIAL EVALUATION ADULT - LIVES WITH, PROFILE
Pt lives in a house with 4 [address] and handrails. There is a flight of stairs and handrail to enter the bedroom. Pt has a walk-in shower and lives with her  and daughter./children/spouse

## 2023-03-22 NOTE — OCCUPATIONAL THERAPY INITIAL EVALUATION ADULT - PERTINENT HX OF CURRENT PROBLEM, REHAB EVAL
62 year old female who presents for a scheduled Right Total Knee Arthroplasty Robot Assisted w/ [facility] on 3/21/23. She is s/p left TKR on 10/19/2021 and no w/ c/o right knee pain. She is doing well with no issues of the left knee besides mild stiffness when she wakes up in the morning. Her right knee has been giving her pain, she indicates diffuse pain at all aspects of the knee. Worsening pain with walking and stairs. PMH of HTN, HLD, Obesity (BMI 37.6) and Hypothyroidism. Denies known hx of COVID, vaccinated. COVID testing scheduled at [facility] on 3/18/23. Med eval on 3/6/23. COVID19 PCR at [facility] on 3/18/23. Med eval on 3/6/23. Pt is now s/p right total knee replacement 21-Mar-2023.     X-Ray Right Knee (3/21/23): IMPRESSION: Expected postoperative appearance of right total knee arthroplasty.

## 2023-03-23 VITALS
HEART RATE: 74 BPM | TEMPERATURE: 98 F | OXYGEN SATURATION: 95 % | SYSTOLIC BLOOD PRESSURE: 156 MMHG | DIASTOLIC BLOOD PRESSURE: 79 MMHG | RESPIRATION RATE: 18 BRPM

## 2023-03-23 PROCEDURE — 36415 COLL VENOUS BLD VENIPUNCTURE: CPT

## 2023-03-23 PROCEDURE — 97116 GAIT TRAINING THERAPY: CPT

## 2023-03-23 PROCEDURE — C1713: CPT

## 2023-03-23 PROCEDURE — 80048 BASIC METABOLIC PNL TOTAL CA: CPT

## 2023-03-23 PROCEDURE — S2900: CPT

## 2023-03-23 PROCEDURE — 27447 TOTAL KNEE ARTHROPLASTY: CPT | Mod: RT

## 2023-03-23 PROCEDURE — 85027 COMPLETE CBC AUTOMATED: CPT

## 2023-03-23 PROCEDURE — C9399: CPT

## 2023-03-23 PROCEDURE — 20985 CPTR-ASST DIR MS PX: CPT

## 2023-03-23 PROCEDURE — 97530 THERAPEUTIC ACTIVITIES: CPT

## 2023-03-23 PROCEDURE — C1776: CPT

## 2023-03-23 PROCEDURE — 97165 OT EVAL LOW COMPLEX 30 MIN: CPT

## 2023-03-23 PROCEDURE — 82962 GLUCOSE BLOOD TEST: CPT

## 2023-03-23 PROCEDURE — 97161 PT EVAL LOW COMPLEX 20 MIN: CPT

## 2023-03-23 PROCEDURE — 73560 X-RAY EXAM OF KNEE 1 OR 2: CPT

## 2023-03-23 RX ORDER — METFORMIN HYDROCHLORIDE 850 MG/1
1 TABLET ORAL
Qty: 0 | Refills: 0 | DISCHARGE

## 2023-03-23 RX ORDER — OXYBUTYNIN CHLORIDE 5 MG
1 TABLET ORAL
Qty: 0 | Refills: 0 | DISCHARGE

## 2023-03-23 RX ORDER — ATORVASTATIN CALCIUM 80 MG/1
1 TABLET, FILM COATED ORAL
Qty: 0 | Refills: 0 | DISCHARGE

## 2023-03-23 RX ADMIN — CELECOXIB 200 MILLIGRAM(S): 200 CAPSULE ORAL at 08:47

## 2023-03-23 RX ADMIN — ONDANSETRON 4 MILLIGRAM(S): 8 TABLET, FILM COATED ORAL at 06:38

## 2023-03-23 RX ADMIN — Medication 30 MILLILITER(S): at 12:48

## 2023-03-23 RX ADMIN — Medication 25 MICROGRAM(S): at 08:46

## 2023-03-23 RX ADMIN — LISINOPRIL 10 MILLIGRAM(S): 2.5 TABLET ORAL at 08:46

## 2023-03-23 RX ADMIN — Medication 975 MILLIGRAM(S): at 08:45

## 2023-03-23 RX ADMIN — PANTOPRAZOLE SODIUM 40 MILLIGRAM(S): 20 TABLET, DELAYED RELEASE ORAL at 08:47

## 2023-03-23 RX ADMIN — CELECOXIB 200 MILLIGRAM(S): 200 CAPSULE ORAL at 09:45

## 2023-03-23 RX ADMIN — Medication 325 MILLIGRAM(S): at 08:47

## 2023-03-23 RX ADMIN — Medication 1 TABLET(S): at 12:47

## 2023-03-23 RX ADMIN — Medication 975 MILLIGRAM(S): at 09:45

## 2023-03-23 NOTE — PROGRESS NOTE ADULT - SUBJECTIVE AND OBJECTIVE BOX
ORTHO PROGRESS NOTE     Patient is status post Right total knee replacement, POD 2  Pt seen and examined at bedside, denies SOB, CP. Reports of nausea vomiting and diarrhea since last night. Per patient, +sick contacts at home who were visitors of the patient with viral illness. Denies abdominal pain.   Pain well controlled. Patient ambulating with rolling walker.     Vital Signs Last 24 Hrs  T(C): 36.9 (23 Mar 2023 04:45), Max: 36.9 (23 Mar 2023 04:45)  T(F): 98.5 (23 Mar 2023 04:45), Max: 98.5 (23 Mar 2023 04:45)  HR: 82 (23 Mar 2023 04:45) (58 - 82)  BP: 154/77 (23 Mar 2023 04:45) (140/61 - 167/70)  BP(mean): --  RR: 18 (23 Mar 2023 04:45) (18 - 18)  SpO2: 97% (23 Mar 2023 04:45) (93% - 97%)    Parameters below as of 23 Mar 2023 04:45  Patient On (Oxygen Delivery Method): room air        Exam:   Gen: No apparent distress  RLE:  Aquacel clean dry and intact to knee  BLE: motor intact EHL/FHL/TA/GS .  Sensation is grossly intact to light touch in the distal extremities.    Compartments are soft bilaterally.  Extremities are warm .  DP 2+ BLE     Labs:  CBC Full  -  ( 22 Mar 2023 06:30 )  WBC Count : 12.30 K/uL  RBC Count : 4.06 M/uL  Hemoglobin : 11.6 g/dL  Hematocrit : 36.6 %  Platelet Count - Automated : 301 K/uL  Mean Cell Volume : 90.1 fl  Mean Cell Hemoglobin : 28.6 pg  Mean Cell Hemoglobin Concentration : 31.7 gm/dL        03-22    139  |  102  |  9   ----------------------------<  138<H>  3.7   |  25  |  0.56    Ca    8.4      22 Mar 2023 06:29         A/P:  Pt is a 62 yr old female s/p right Knee Arthroplasty, POD #2    - Pain control/ Analgesia  - DVT ppx  BID x 6 weeks, foot pumps  - PT/OT - wbat/oob    - Incentive Spirometer  - GI prophylaxis: Protonix  - notify Ortho for questions   - Zofran prn nausea/ Maalox ordered. Patient with sick contacts at home with same viral symptoms, likely viral illness contributing to GI symptoms. Will continue to monitor.   - Dispo to home    Alicia Rogers PA-C  Orthopedic Surgery  Team Pager #[phone]

## 2023-04-06 ENCOUNTER — APPOINTMENT (OUTPATIENT)
Dept: ORTHOPEDIC SURGERY | Facility: CLINIC | Age: 63
End: 2023-04-06
Payer: COMMERCIAL

## 2023-04-06 VITALS
BODY MASS INDEX: 38.41 KG/M2 | HEIGHT: 58 IN | SYSTOLIC BLOOD PRESSURE: 165 MMHG | DIASTOLIC BLOOD PRESSURE: 76 MMHG | WEIGHT: 183 LBS | HEART RATE: 75 BPM

## 2023-04-06 DIAGNOSIS — Z96.652 PRESENCE OF LEFT ARTIFICIAL KNEE JOINT: ICD-10-CM

## 2023-04-06 PROCEDURE — 73562 X-RAY EXAM OF KNEE 3: CPT | Mod: RT

## 2023-04-06 PROCEDURE — 99024 POSTOP FOLLOW-UP VISIT: CPT

## 2023-04-10 PROBLEM — Z96.652 STATUS POST LEFT KNEE REPLACEMENT: Status: ACTIVE | Noted: 2021-11-10

## 2023-06-08 NOTE — PRE-OP CHECKLIST - ALLERGY BAND ON
no known allergies
Patient received in stretcher. Alert and awake. Respirations even and unlabored. Spontaneous movement of all extremities noted. Presents to ER c/o " shes here to be admitted for a full neuro and cardio work up" As per patient group home aide at bedside she was supposed to have all of this done outpatient but was sent into ER for unknown reason. Smile symmetric. Speech spontaneous. +/= sensation BL. +/= strength BL. Pending MD marion, Comfort and safety maintained. All current care needs met. Care plan continued Keenan ENNIS

## 2023-08-14 ENCOUNTER — APPOINTMENT (OUTPATIENT)
Dept: ORTHOPEDIC SURGERY | Facility: CLINIC | Age: 63
End: 2023-08-14
Payer: COMMERCIAL

## 2023-08-14 VITALS — BODY MASS INDEX: 38.2 KG/M2 | WEIGHT: 182 LBS | HEIGHT: 58 IN

## 2023-08-14 PROCEDURE — 73562 X-RAY EXAM OF KNEE 3: CPT | Mod: RT

## 2023-08-14 PROCEDURE — 99203 OFFICE O/P NEW LOW 30 MIN: CPT

## 2023-08-14 NOTE — HISTORY OF PRESENT ILLNESS
[Left Leg] : left leg [Right Leg] : right leg [Gradual] : gradual [4] : 4 [3] : 3 [Localized] : localized [Tightness] : tightness [Intermittent] : intermittent [Household chores] : household chores [Rest] : rest [Standing] : standing [Walking] : walking [Stairs] : stairs [de-identified] : 8/14/23: 63yo F presents for eval of her right knee. She has a hx of R TKA by outside ortho (Dr. Carballo). States she has less pain, but she continues to walk with a limp and weakness in the knees. She also admits to stiffness.  [] : no [FreeTextEntry1] : knees  [FreeTextEntry5] : 08/14/23 pt presents here today with right knee pain h/o bl tkas done by Dr Ibrahim  right tka done march 2023  [de-identified] : Dr Ibrahim  [de-identified] : s

## 2023-08-14 NOTE — ASSESSMENT
[FreeTextEntry1] : 62F 5 mo s/p R TKA  Continue Pt No obvious complications on XR or exam f/u Dr Ibrahim as schedule   We discussed the patient's progress and they were reminded of their antibiotic prophylaxis. We discussed continued physical therapy and/or a home exercise program. Questions about their knee replacement and future follow up were answered and discussed.

## 2023-08-14 NOTE — PHYSICAL EXAM
[NL (0)] : extension 0 degrees [5___] : hamstring 5[unfilled]/5 [] : ligamentously stable [Right] : right knee [There are no fractures, subluxations or dislocations. No significant abnormalities are seen] : There are no fractures, subluxations or dislocations. No significant abnormalities are seen [No loss of surgical correlation. Bony alignment acceptable. Hardware in appropriate position] : No loss of surgical correlation. Bony alignment acceptable. Hardware in appropriate position [TWNoteComboBox7] : flexion 105 degrees

## 2023-09-26 ENCOUNTER — NON-APPOINTMENT (OUTPATIENT)
Age: 63
End: 2023-09-26

## 2024-04-05 ENCOUNTER — APPOINTMENT (OUTPATIENT)
Dept: ORTHOPEDIC SURGERY | Facility: CLINIC | Age: 64
End: 2024-04-05
Payer: MEDICARE

## 2024-04-05 DIAGNOSIS — M51.37 OTHER INTERVERTEBRAL DISC DEGENERATION, LUMBOSACRAL REGION: ICD-10-CM

## 2024-04-05 DIAGNOSIS — Z96.651 PRESENCE OF RIGHT ARTIFICIAL KNEE JOINT: ICD-10-CM

## 2024-04-05 PROCEDURE — 73562 X-RAY EXAM OF KNEE 3: CPT | Mod: 50

## 2024-04-05 PROCEDURE — 99214 OFFICE O/P EST MOD 30 MIN: CPT

## 2024-04-09 PROBLEM — Z96.651 STATUS POST RIGHT KNEE REPLACEMENT: Status: ACTIVE | Noted: 2023-04-06

## 2024-05-15 NOTE — H&P PST ADULT - NSICDXPASTSURGICALHX_GEN_ALL_CORE_FT
Call placed to patient. Provider response relayed. He is scheduled follow up 5/20     Yessenia Elise RN     PAST SURGICAL HISTORY:  H/O umbilical hernia repair     History of total left knee replacement

## (undated) DEVICE — DRSG DERMABOND 0.7ML

## (undated) DEVICE — SUT QUILL MONODERM 2-0 3/8 CIRCLE 45CM

## (undated) DEVICE — HOOD FLYTE STRYKER HELMET SHIELD

## (undated) DEVICE — ELCTR PLASMA BLADE X 3.0S WIDE TIP

## (undated) DEVICE — SYR LUER LOK 20CC

## (undated) DEVICE — DRSG AQUACEL 3.5 X 12"

## (undated) DEVICE — WARMING BLANKET UPPER ADULT

## (undated) DEVICE — MAKO VIZADISC KNEE TRACKING KIT

## (undated) DEVICE — FEMORAL PREP SINGLE USE SZ2

## (undated) DEVICE — POSITIONER FOAM EGG CRATE ULNAR 2PCS (PINK)

## (undated) DEVICE — DRAPE 3/4 SHEET W REINFORCEMENT 56X77"

## (undated) DEVICE — NDL HYPO SAFE 22G X 1.5" (BLACK)

## (undated) DEVICE — DRSG AQUACEL 3.5 X 10"

## (undated) DEVICE — VENODYNE/SCD SLEEVE CALF LARGE

## (undated) DEVICE — GLV 8 PROTEXIS (WHITE)

## (undated) DEVICE — MAKO DRAPE KIT

## (undated) DEVICE — SPECIMEN CONTAINER 100ML

## (undated) DEVICE — GLV 7.5 PROTEXIS (WHITE)

## (undated) DEVICE — WOUND IRR IRRISEPT W 0.5 CHG

## (undated) DEVICE — SUT POLYSORB 2-0 30" GS-21 UNDYED

## (undated) DEVICE — POSITIONER CARDIAC BUMP

## (undated) DEVICE — SUT POLYSORB 1 36" GS-21 UNDYED

## (undated) DEVICE — TIBIAL PREP SINGLE USE SZ2

## (undated) DEVICE — TOURNIQUET CUFF 34" DUAL PORT W PLC

## (undated) DEVICE — SOL IRR POUR H2O 1000ML

## (undated) DEVICE — STRYKER MIXEVAC 3 BONE CEMENT MIXER

## (undated) DEVICE — SUT PDO 2 1/2 CIRCLE 40MM NDL 45CM

## (undated) DEVICE — SOL IRR POUR NS 0.9% 500ML

## (undated) DEVICE — GLV 8.5 PROTEXIS (WHITE)

## (undated) DEVICE — SAW BLADE STRYKER SAGITTAL DUAL CUT 64X35X.89MM

## (undated) DEVICE — TUBING SUCTION 20FT

## (undated) DEVICE — PACK TOTAL KNEE (2 PACKS)